# Patient Record
Sex: FEMALE | Race: WHITE | NOT HISPANIC OR LATINO | Employment: UNEMPLOYED | URBAN - METROPOLITAN AREA
[De-identification: names, ages, dates, MRNs, and addresses within clinical notes are randomized per-mention and may not be internally consistent; named-entity substitution may affect disease eponyms.]

---

## 2023-10-09 ENCOUNTER — OFFICE VISIT (OUTPATIENT)
Dept: FAMILY MEDICINE | Facility: CLINIC | Age: 58
End: 2023-10-09
Payer: COMMERCIAL

## 2023-10-09 ENCOUNTER — HOSPITAL ENCOUNTER (OUTPATIENT)
Dept: RADIOLOGY | Facility: HOSPITAL | Age: 58
Discharge: HOME | End: 2023-10-09
Attending: NURSE PRACTITIONER
Payer: COMMERCIAL

## 2023-10-09 ENCOUNTER — TELEPHONE (OUTPATIENT)
Dept: FAMILY MEDICINE | Facility: CLINIC | Age: 58
End: 2023-10-09

## 2023-10-09 VITALS
BODY MASS INDEX: 28.49 KG/M2 | HEIGHT: 65 IN | WEIGHT: 171 LBS | OXYGEN SATURATION: 99 % | HEART RATE: 72 BPM | SYSTOLIC BLOOD PRESSURE: 134 MMHG | DIASTOLIC BLOOD PRESSURE: 74 MMHG

## 2023-10-09 DIAGNOSIS — R06.02 SHORTNESS OF BREATH: ICD-10-CM

## 2023-10-09 DIAGNOSIS — R07.81 RIB PAIN: ICD-10-CM

## 2023-10-09 DIAGNOSIS — Z12.11 SCREENING FOR COLON CANCER: ICD-10-CM

## 2023-10-09 DIAGNOSIS — R92.8 ABNORMAL MAMMOGRAM: Primary | ICD-10-CM

## 2023-10-09 DIAGNOSIS — Z12.39 ENCOUNTER FOR SCREENING FOR MALIGNANT NEOPLASM OF BREAST, UNSPECIFIED SCREENING MODALITY: ICD-10-CM

## 2023-10-09 DIAGNOSIS — Z79.899 HIGH RISK MEDICATION USE: Primary | ICD-10-CM

## 2023-10-09 DIAGNOSIS — J34.89 SORE IN NOSE: ICD-10-CM

## 2023-10-09 DIAGNOSIS — G43.909 MIGRAINE WITHOUT STATUS MIGRAINOSUS, NOT INTRACTABLE, UNSPECIFIED MIGRAINE TYPE: ICD-10-CM

## 2023-10-09 DIAGNOSIS — Z00.00 PHYSICAL EXAM: ICD-10-CM

## 2023-10-09 DIAGNOSIS — R92.8 ABNORMAL MAMMOGRAM: ICD-10-CM

## 2023-10-09 PROCEDURE — 3078F DIAST BP <80 MM HG: CPT | Mod: CPTII,S$GLB,, | Performed by: NURSE PRACTITIONER

## 2023-10-09 PROCEDURE — 1159F MED LIST DOCD IN RCRD: CPT | Mod: CPTII,S$GLB,, | Performed by: NURSE PRACTITIONER

## 2023-10-09 PROCEDURE — 1160F PR REVIEW ALL MEDS BY PRESCRIBER/CLIN PHARMACIST DOCUMENTED: ICD-10-PCS | Mod: CPTII,S$GLB,, | Performed by: NURSE PRACTITIONER

## 2023-10-09 PROCEDURE — 3075F SYST BP GE 130 - 139MM HG: CPT | Mod: CPTII,S$GLB,, | Performed by: NURSE PRACTITIONER

## 2023-10-09 PROCEDURE — 3008F PR BODY MASS INDEX (BMI) DOCUMENTED: ICD-10-PCS | Mod: CPTII,S$GLB,, | Performed by: NURSE PRACTITIONER

## 2023-10-09 PROCEDURE — 99204 PR OFFICE/OUTPT VISIT, NEW, LEVL IV, 45-59 MIN: ICD-10-PCS | Mod: S$GLB,,, | Performed by: NURSE PRACTITIONER

## 2023-10-09 PROCEDURE — 3008F BODY MASS INDEX DOCD: CPT | Mod: CPTII,S$GLB,, | Performed by: NURSE PRACTITIONER

## 2023-10-09 PROCEDURE — 1160F RVW MEDS BY RX/DR IN RCRD: CPT | Mod: CPTII,S$GLB,, | Performed by: NURSE PRACTITIONER

## 2023-10-09 PROCEDURE — 99204 OFFICE O/P NEW MOD 45 MIN: CPT | Mod: S$GLB,,, | Performed by: NURSE PRACTITIONER

## 2023-10-09 PROCEDURE — 3075F PR MOST RECENT SYSTOLIC BLOOD PRESS GE 130-139MM HG: ICD-10-PCS | Mod: CPTII,S$GLB,, | Performed by: NURSE PRACTITIONER

## 2023-10-09 PROCEDURE — 1159F PR MEDICATION LIST DOCUMENTED IN MEDICAL RECORD: ICD-10-PCS | Mod: CPTII,S$GLB,, | Performed by: NURSE PRACTITIONER

## 2023-10-09 PROCEDURE — 71100 X-RAY EXAM RIBS UNI 2 VIEWS: CPT | Mod: TC,PO,RT

## 2023-10-09 PROCEDURE — 3078F PR MOST RECENT DIASTOLIC BLOOD PRESSURE < 80 MM HG: ICD-10-PCS | Mod: CPTII,S$GLB,, | Performed by: NURSE PRACTITIONER

## 2023-10-09 RX ORDER — METHYLPREDNISOLONE 4 MG/1
TABLET ORAL
Qty: 21 EACH | Refills: 0 | Status: SHIPPED | OUTPATIENT
Start: 2023-10-09 | End: 2023-10-30

## 2023-10-09 RX ORDER — MUPIROCIN 20 MG/G
OINTMENT TOPICAL 3 TIMES DAILY
Qty: 22 G | Refills: 0 | Status: SHIPPED | OUTPATIENT
Start: 2023-10-09

## 2023-10-09 NOTE — PROGRESS NOTES
SUBJECTIVE:    Patient ID: Coral Gerard is a 58 y.o. female.    Chief Complaint: Establish Care (No bottles, Establish care, Mammo, pain in left breast from fluid being aspirated form breast, Right rib pain, possible pulled muscle in left arm and elbow, Sore inside nose only happens when she goes to her house, forgot medication and does no know name and strengths of medication//BA )    58 year old female presents as new patient to establish care. Recently moved to area from [place]. Doing some renovations on home. Takes medications for migraine but not sure of name. Previously had biopsy of breast and reports fluid since. No recent mammogram. Does have some discomfort at times. No recent labs.    She has some pain in the arms and also the neck pain in the right shoulder and right side chest wall pain. Has not taken anything. Feels sob at times. This has occurred for several years. Worse with exertion        Office Visit on 10/09/2023   Component Date Value Ref Range Status    WBC 10/09/2023 7.4  3.8 - 10.8 Thousand/uL Final    RBC 10/09/2023 4.62  3.80 - 5.10 Million/uL Final    Hemoglobin 10/09/2023 13.9  11.7 - 15.5 g/dL Final    Hematocrit 10/09/2023 42.6  35.0 - 45.0 % Final    MCV 10/09/2023 92.2  80.0 - 100.0 fL Final    MCH 10/09/2023 30.1  27.0 - 33.0 pg Final    MCHC 10/09/2023 32.6  32.0 - 36.0 g/dL Final    RDW 10/09/2023 13.7  11.0 - 15.0 % Final    Platelets 10/09/2023 236  140 - 400 Thousand/uL Final    MPV 10/09/2023 11.4  7.5 - 12.5 fL Final    Neutrophils, Abs 10/09/2023 4,965  1,500 - 7,800 cells/uL Final    Lymph # 10/09/2023 1,695  850 - 3,900 cells/uL Final    Mono # 10/09/2023 636  200 - 950 cells/uL Final    Eos # 10/09/2023 67  15 - 500 cells/uL Final    Baso # 10/09/2023 37  0 - 200 cells/uL Final    Neutrophils Relative 10/09/2023 67.1  % Final    Lymph % 10/09/2023 22.9  % Final    Mono % 10/09/2023 8.6  % Final    Eosinophil % 10/09/2023 0.9  % Final    Basophil % 10/09/2023 0.5  %  Final    Glucose 10/09/2023 90  65 - 99 mg/dL Final    BUN 10/09/2023 20  7 - 25 mg/dL Final    Creatinine 10/09/2023 0.66  0.50 - 1.05 mg/dL Final    eGFR 10/09/2023 100  > OR = 60 mL/min/1.73m2 Final    BUN/Creatinine Ratio 10/09/2023 SEE NOTE:  6 - 22 (calc) Final    Sodium 10/09/2023 139  135 - 146 mmol/L Final    Potassium 10/09/2023 4.1  3.5 - 5.3 mmol/L Final    Chloride 10/09/2023 100  98 - 110 mmol/L Final    CO2 10/09/2023 26  20 - 32 mmol/L Final    Calcium 10/09/2023 10.0  8.6 - 10.4 mg/dL Final    Total Protein 10/09/2023 6.8  6.1 - 8.1 g/dL Final    Albumin 10/09/2023 4.4  3.6 - 5.1 g/dL Final    Globulin, Total 10/09/2023 2.4  1.9 - 3.7 g/dL (calc) Final    Albumin/Globulin Ratio 10/09/2023 1.8  1.0 - 2.5 (calc) Final    Total Bilirubin 10/09/2023 0.7  0.2 - 1.2 mg/dL Final    Alkaline Phosphatase 10/09/2023 74  37 - 153 U/L Final    AST 10/09/2023 11  10 - 35 U/L Final    ALT 10/09/2023 18  6 - 29 U/L Final    Cholesterol 10/09/2023 287 (H)  <200 mg/dL Final    HDL 10/09/2023 40 (L)  > OR = 50 mg/dL Final    Triglycerides 10/09/2023 732 (H)  <150 mg/dL Final    LDL Cholesterol 10/09/2023   mg/dL (calc) Final    HDL/Cholesterol Ratio 10/09/2023 7.2 (H)  <5.0 (calc) Final    Non HDL Chol. (LDL+VLDL) 10/09/2023 247 (H)  <130 mg/dL (calc) Final    TSH w/reflex to FT4 10/09/2023 1.52  0.40 - 4.50 mIU/L Final    Creatinine, Urine 10/09/2023 56  20 - 275 mg/dL Final    Microalb, Ur 10/09/2023 0.3  See Note: mg/dL Final    Microalb/Creat Ratio 10/09/2023 5  <30 mcg/mg creat Final    Color, UA 10/09/2023 YELLOW  YELLOW Final    Appearance, UA 10/09/2023 CLEAR  CLEAR Final    Specific Gravity, UA 10/09/2023 1.013  1.001 - 1.035 Final    pH, UA 10/09/2023 6.0  5.0 - 8.0 Final    Glucose, UA 10/09/2023 NEGATIVE  NEGATIVE Final    Bilirubin, UA 10/09/2023 NEGATIVE  NEGATIVE Final    Ketones, UA 10/09/2023 NEGATIVE  NEGATIVE Final    Occult Blood UA 10/09/2023 NEGATIVE  NEGATIVE Final    Protein, UA 10/09/2023  NEGATIVE  NEGATIVE Final    Nitrite, UA 10/09/2023 NEGATIVE  NEGATIVE Final    Leukocytes, UA 10/09/2023 NEGATIVE  NEGATIVE Final    WBC Casts, UA 10/09/2023 NONE SEEN  < OR = 5 /HPF Final    RBC Casts, UA 10/09/2023 NONE SEEN  < OR = 2 /HPF Final    Squam Epithel, UA 10/09/2023 NONE SEEN  < OR = 5 /HPF Final    Bacteria, UA 10/09/2023 NONE SEEN  NONE SEEN /HPF Final    Hyaline Casts, UA 10/09/2023 NONE SEEN  NONE SEEN /LPF Final    Service Cmt: 10/09/2023    Final    Reflexive Urine Culture 10/09/2023    Final       Past Medical History:   Diagnosis Date    Anxiety     Palpitations      Social History     Socioeconomic History    Marital status:    Tobacco Use    Smoking status: Former     Types: Cigarettes    Smokeless tobacco: Never   Substance and Sexual Activity    Alcohol use: Yes     Alcohol/week: 14.0 standard drinks of alcohol     Types: 14 Glasses of wine per week    Drug use: Never   Social History Narrative    Worked as . Not currently working    Gets about 7 hours of sleep per night    No formal exercise.     Works in yard, and pool.      Past Surgical History:   Procedure Laterality Date     SECTION       SECTION N/A     CHOLECYSTECTOMY N/A     HYSTERECTOMY       Family History   Adopted: Yes   Problem Relation Age of Onset    Diabetes Mother     Diabetes Father        Tests to Keep You Healthy    Mammogram: SCHEDULED  Colon Cancer Screening: ORDERED      Review of patient's allergies indicates:   Allergen Reactions    Demerol (pf) [meperidine (pf)] Itching       Current Outpatient Medications:     methylPREDNISolone (MEDROL DOSEPACK) 4 mg tablet, use as directed, Disp: 21 each, Rfl: 0    mupirocin (BACTROBAN) 2 % ointment, Apply topically 3 (three) times daily., Disp: 22 g, Rfl: 0    omega-3 acid ethyl esters (LOVAZA) 1 gram capsule, Take 2 capsules (2 g total) by mouth 2 (two) times daily., Disp: 360 capsule, Rfl: 3    rosuvastatin (CRESTOR) 10 MG tablet, Take 1 tablet  "(10 mg total) by mouth once daily., Disp: 90 tablet, Rfl: 3    Review of Systems   Constitutional:  Negative for chills, fever and unexpected weight change.   HENT:  Negative for ear pain, rhinorrhea and sore throat.    Eyes:  Negative for pain and visual disturbance.   Respiratory:  Negative for cough and shortness of breath.    Cardiovascular:  Negative for chest pain, palpitations and leg swelling.   Gastrointestinal:  Negative for abdominal pain, diarrhea, nausea and vomiting.   Genitourinary:  Negative for difficulty urinating, hematuria and vaginal bleeding.   Musculoskeletal:  Positive for arthralgias.   Skin:  Negative for rash.   Neurological:  Negative for dizziness, weakness and headaches.   Psychiatric/Behavioral:  Negative for agitation and sleep disturbance. The patient is not nervous/anxious.           Objective:      Vitals:    10/09/23 1156 10/09/23 1224   BP: (!) 140/88 134/74   Pulse: 72    SpO2: 99%    Weight: 77.6 kg (171 lb)    Height: 5' 5" (1.651 m)      Physical Exam  Vitals and nursing note reviewed.   Constitutional:       General: She is not in acute distress.     Appearance: Normal appearance. She is well-developed. She is not ill-appearing.   HENT:      Head: Normocephalic.      Right Ear: External ear normal.      Left Ear: External ear normal.   Eyes:      Conjunctiva/sclera: Conjunctivae normal.      Pupils: Pupils are equal, round, and reactive to light.   Neck:      Vascular: No JVD.   Cardiovascular:      Rate and Rhythm: Normal rate and regular rhythm.      Heart sounds: No murmur heard.  Pulmonary:      Effort: Pulmonary effort is normal.      Breath sounds: Normal breath sounds.   Chest:          Comments: Palpation tenderness. No deformity  Abdominal:      General: Bowel sounds are normal.      Palpations: Abdomen is soft.   Musculoskeletal:         General: No deformity. Normal range of motion.      Cervical back: Normal range of motion and neck supple.   Lymphadenopathy:     "  Cervical: No cervical adenopathy.   Skin:     General: Skin is warm and dry.      Findings: No rash.   Neurological:      Mental Status: She is alert and oriented to person, place, and time.      Gait: Gait normal.   Psychiatric:         Speech: Speech normal.         Behavior: Behavior normal.           Assessment:       1. High risk medication use    2. Physical exam    3. Migraine without status migrainosus, not intractable, unspecified migraine type    4. Encounter for screening for malignant neoplasm of breast, unspecified screening modality    5. Abnormal mammogram    6. Screening for colon cancer    7. Rib pain    8. Shortness of breath    9. Sore in nose         Plan:       High risk medication use  -     CBC Auto Differential; Future; Expected date: 10/09/2023  -     Comprehensive Metabolic Panel; Future; Expected date: 10/09/2023  -     Lipid Panel; Future; Expected date: 10/09/2023  -     TSH w/reflex to FT4; Future; Expected date: 10/09/2023  -     Microalbumin/Creatinine Ratio, Urine; Future; Expected date: 10/09/2023  -     Urinalysis, Reflex to Urine Culture Urine, Clean Catch; Future; Expected date: 10/09/2023    Physical exam  -     CBC Auto Differential; Future; Expected date: 10/09/2023  -     Comprehensive Metabolic Panel; Future; Expected date: 10/09/2023  -     Lipid Panel; Future; Expected date: 10/09/2023  -     TSH w/reflex to FT4; Future; Expected date: 10/09/2023  -     Microalbumin/Creatinine Ratio, Urine; Future; Expected date: 10/09/2023  -     Urinalysis, Reflex to Urine Culture Urine, Clean Catch; Future; Expected date: 10/09/2023    Migraine without status migrainosus, not intractable, unspecified migraine type  Comments:  not sure of meds  Orders:  -     CBC Auto Differential; Future; Expected date: 10/09/2023  -     Comprehensive Metabolic Panel; Future; Expected date: 10/09/2023  -     Lipid Panel; Future; Expected date: 10/09/2023  -     TSH w/reflex to FT4; Future; Expected  date: 10/09/2023  -     Microalbumin/Creatinine Ratio, Urine; Future; Expected date: 10/09/2023  -     Urinalysis, Reflex to Urine Culture Urine, Clean Catch; Future; Expected date: 10/09/2023    Encounter for screening for malignant neoplasm of breast, unspecified screening modality  -     Mammo Digital Screening Bilat; Future; Expected date: 10/09/2023    Abnormal mammogram  Comments:  migraine  Orders:  -     Mammo Digital Screening Bilat; Future; Expected date: 10/09/2023    Screening for colon cancer  -     Cologuard Screening (Multitarget Stool DNA); Future; Expected date: 10/09/2023    Rib pain  Comments:  xray  Orders:  -     X-Ray Ribs 2 View Right; Future; Expected date: 10/09/2023    Shortness of breath  Comments:  pft  Orders:  -     Complete PFT w/ bronchodilator; Future    Sore in nose  Comments:  bactroban    Other orders  -     methylPREDNISolone (MEDROL DOSEPACK) 4 mg tablet; use as directed  Dispense: 21 each; Refill: 0  -     mupirocin (BACTROBAN) 2 % ointment; Apply topically 3 (three) times daily.  Dispense: 22 g; Refill: 0      Follow up in about 6 months (around 4/9/2024), or if symptoms worsen or fail to improve, for medication management.        10/18/2023 Milla Floyd

## 2023-10-09 NOTE — TELEPHONE ENCOUNTER
Received a call from [facility] that states the patient told them her last mammogram resulted in a biopsy and she has not had a follow up since then. Also states she has been having breast pain so they need an order for diagnostic.

## 2023-10-10 ENCOUNTER — TELEPHONE (OUTPATIENT)
Dept: FAMILY MEDICINE | Facility: CLINIC | Age: 58
End: 2023-10-10

## 2023-10-10 LAB
ALBUMIN SERPL-MCNC: 4.4 G/DL (ref 3.6–5.1)
ALBUMIN/CREAT UR: 5 MCG/MG CREAT
ALBUMIN/GLOB SERPL: 1.8 (CALC) (ref 1–2.5)
ALP SERPL-CCNC: 74 U/L (ref 37–153)
ALT SERPL-CCNC: 18 U/L (ref 6–29)
APPEARANCE UR: CLEAR
AST SERPL-CCNC: 11 U/L (ref 10–35)
BACTERIA #/AREA URNS HPF: NORMAL /HPF
BACTERIA UR CULT: NORMAL
BASOPHILS # BLD AUTO: 37 CELLS/UL (ref 0–200)
BASOPHILS NFR BLD AUTO: 0.5 %
BILIRUB SERPL-MCNC: 0.7 MG/DL (ref 0.2–1.2)
BILIRUB UR QL STRIP: NEGATIVE
BUN SERPL-MCNC: 20 MG/DL (ref 7–25)
BUN/CREAT SERPL: NORMAL (CALC) (ref 6–22)
CALCIUM SERPL-MCNC: 10 MG/DL (ref 8.6–10.4)
CHLORIDE SERPL-SCNC: 100 MMOL/L (ref 98–110)
CHOLEST SERPL-MCNC: 287 MG/DL
CHOLEST/HDLC SERPL: 7.2 (CALC)
CO2 SERPL-SCNC: 26 MMOL/L (ref 20–32)
COLOR UR: YELLOW
CREAT SERPL-MCNC: 0.66 MG/DL (ref 0.5–1.05)
CREAT UR-MCNC: 56 MG/DL (ref 20–275)
EGFR: 100 ML/MIN/1.73M2
EOSINOPHIL # BLD AUTO: 67 CELLS/UL (ref 15–500)
EOSINOPHIL NFR BLD AUTO: 0.9 %
ERYTHROCYTE [DISTWIDTH] IN BLOOD BY AUTOMATED COUNT: 13.7 % (ref 11–15)
GLOBULIN SER CALC-MCNC: 2.4 G/DL (CALC) (ref 1.9–3.7)
GLUCOSE SERPL-MCNC: 90 MG/DL (ref 65–99)
GLUCOSE UR QL STRIP: NEGATIVE
HCT VFR BLD AUTO: 42.6 % (ref 35–45)
HDLC SERPL-MCNC: 40 MG/DL
HGB BLD-MCNC: 13.9 G/DL (ref 11.7–15.5)
HGB UR QL STRIP: NEGATIVE
HYALINE CASTS #/AREA URNS LPF: NORMAL /LPF
KETONES UR QL STRIP: NEGATIVE
LDLC SERPL CALC-MCNC: ABNORMAL MG/DL (CALC)
LEUKOCYTE ESTERASE UR QL STRIP: NEGATIVE
LYMPHOCYTES # BLD AUTO: 1695 CELLS/UL (ref 850–3900)
LYMPHOCYTES NFR BLD AUTO: 22.9 %
MCH RBC QN AUTO: 30.1 PG (ref 27–33)
MCHC RBC AUTO-ENTMCNC: 32.6 G/DL (ref 32–36)
MCV RBC AUTO: 92.2 FL (ref 80–100)
MICROALBUMIN UR-MCNC: 0.3 MG/DL
MONOCYTES # BLD AUTO: 636 CELLS/UL (ref 200–950)
MONOCYTES NFR BLD AUTO: 8.6 %
NEUTROPHILS # BLD AUTO: 4965 CELLS/UL (ref 1500–7800)
NEUTROPHILS NFR BLD AUTO: 67.1 %
NITRITE UR QL STRIP: NEGATIVE
NONHDLC SERPL-MCNC: 247 MG/DL (CALC)
PH UR STRIP: 6 [PH] (ref 5–8)
PLATELET # BLD AUTO: 236 THOUSAND/UL (ref 140–400)
PMV BLD REES-ECKER: 11.4 FL (ref 7.5–12.5)
POTASSIUM SERPL-SCNC: 4.1 MMOL/L (ref 3.5–5.3)
PROT SERPL-MCNC: 6.8 G/DL (ref 6.1–8.1)
PROT UR QL STRIP: NEGATIVE
RBC # BLD AUTO: 4.62 MILLION/UL (ref 3.8–5.1)
RBC #/AREA URNS HPF: NORMAL /HPF
SERVICE CMNT-IMP: NORMAL
SODIUM SERPL-SCNC: 139 MMOL/L (ref 135–146)
SP GR UR STRIP: 1.01 (ref 1–1.03)
SQUAMOUS #/AREA URNS HPF: NORMAL /HPF
TRIGL SERPL-MCNC: 732 MG/DL
TSH SERPL-ACNC: 1.52 MIU/L (ref 0.4–4.5)
WBC # BLD AUTO: 7.4 THOUSAND/UL (ref 3.8–10.8)
WBC #/AREA URNS HPF: NORMAL /HPF

## 2023-10-10 NOTE — TELEPHONE ENCOUNTER
Spoke to patient with result verbatim per Milla. Verbalized understanding. Said Milla was supposed to call in an ointment for a sore in her nose. Said she got the Bactroban but she thinks this was for her elbow.  Would like clarification on what the Bactroban is for.

## 2023-10-11 RX ORDER — OMEGA-3-ACID ETHYL ESTERS 1 G/1
2 CAPSULE, LIQUID FILLED ORAL 2 TIMES DAILY
Qty: 360 CAPSULE | Refills: 3 | Status: SHIPPED | OUTPATIENT
Start: 2023-10-11 | End: 2024-10-10

## 2023-10-11 RX ORDER — ROSUVASTATIN CALCIUM 10 MG/1
10 TABLET, COATED ORAL DAILY
Qty: 90 TABLET | Refills: 3 | Status: SHIPPED | OUTPATIENT
Start: 2023-10-11 | End: 2024-10-10

## 2023-10-12 ENCOUNTER — TELEPHONE (OUTPATIENT)
Dept: FAMILY MEDICINE | Facility: CLINIC | Age: 58
End: 2023-10-12

## 2023-10-12 NOTE — TELEPHONE ENCOUNTER
----- Message from Milla Floyd NP sent at 10/11/2023  4:29 PM CDT -----  Cholesterol and triglycerides are Extremely high. There is increased risk of pancreatitis when the   triglyceride concentration is this high. Start low fat diet. Avoid alcohol if any. Start lovaza and crestor 10mg daily. Rest of labs are within normal limits.

## 2023-10-12 NOTE — TELEPHONE ENCOUNTER
Spoke with patient and let her know the below per Milla verbatim. Wants to know when to repeat labs.

## 2023-10-16 ENCOUNTER — TELEPHONE (OUTPATIENT)
Dept: FAMILY MEDICINE | Facility: CLINIC | Age: 58
End: 2023-10-16

## 2023-10-16 NOTE — TELEPHONE ENCOUNTER
----- Message from Filomena Vail sent at 10/16/2023  9:57 AM CDT -----  What is the diagnosis for PA for Oliver? Thank you

## 2023-10-19 ENCOUNTER — HOSPITAL ENCOUNTER (OUTPATIENT)
Dept: PULMONOLOGY | Facility: HOSPITAL | Age: 58
Discharge: HOME | End: 2023-10-19
Attending: NURSE PRACTITIONER
Payer: COMMERCIAL

## 2023-10-19 DIAGNOSIS — R06.02 SHORTNESS OF BREATH: ICD-10-CM

## 2023-10-19 PROCEDURE — 94010 BREATHING CAPACITY TEST: CPT

## 2023-10-19 PROCEDURE — 94729 DIFFUSING CAPACITY: CPT

## 2023-10-19 PROCEDURE — 94727 GAS DIL/WSHOT DETER LNG VOL: CPT

## 2023-10-25 ENCOUNTER — PATIENT MESSAGE (OUTPATIENT)
Dept: FAMILY MEDICINE | Facility: CLINIC | Age: 58
End: 2023-10-25

## 2023-10-26 ENCOUNTER — PATIENT MESSAGE (OUTPATIENT)
Dept: FAMILY MEDICINE | Facility: CLINIC | Age: 58
End: 2023-10-26

## 2023-10-26 NOTE — TELEPHONE ENCOUNTER
No appointment necessary. The pft is normal. Maybe some degenerative changes going on in elbow and shoulder. Would she like to see ortho?

## 2023-10-28 LAB — NONINV COLON CA DNA+OCC BLD SCRN STL QL: NEGATIVE

## 2023-10-29 ENCOUNTER — PATIENT MESSAGE (OUTPATIENT)
Dept: FAMILY MEDICINE | Facility: CLINIC | Age: 58
End: 2023-10-29

## 2023-10-30 ENCOUNTER — PATIENT MESSAGE (OUTPATIENT)
Dept: FAMILY MEDICINE | Facility: CLINIC | Age: 58
End: 2023-10-30

## 2023-10-30 ENCOUNTER — TELEPHONE (OUTPATIENT)
Dept: FAMILY MEDICINE | Facility: CLINIC | Age: 58
End: 2023-10-30

## 2023-10-30 DIAGNOSIS — G89.29 CHRONIC LEFT SHOULDER PAIN: ICD-10-CM

## 2023-10-30 DIAGNOSIS — R07.81 RIB PAIN: Primary | ICD-10-CM

## 2023-10-30 DIAGNOSIS — M25.512 CHRONIC LEFT SHOULDER PAIN: ICD-10-CM

## 2023-10-30 NOTE — TELEPHONE ENCOUNTER
Spoke with patient gave results verbatim per Milla, patient verbalized understanding. Remind me created 1 year.

## 2023-11-13 ENCOUNTER — HOSPITAL ENCOUNTER (OUTPATIENT)
Dept: RADIOLOGY | Facility: HOSPITAL | Age: 58
Discharge: HOME | End: 2023-11-13
Attending: NURSE PRACTITIONER
Payer: COMMERCIAL

## 2023-11-13 VITALS — BODY MASS INDEX: 28.5 KG/M2 | WEIGHT: 171.06 LBS | HEIGHT: 65 IN

## 2023-11-13 DIAGNOSIS — R92.8 ABNORMAL MAMMOGRAM: ICD-10-CM

## 2023-11-13 PROCEDURE — 76642 ULTRASOUND BREAST LIMITED: CPT | Mod: TC,PO,LT

## 2023-11-13 PROCEDURE — 77062 BREAST TOMOSYNTHESIS BI: CPT | Mod: TC,PO

## 2023-11-14 ENCOUNTER — TELEPHONE (OUTPATIENT)
Dept: FAMILY MEDICINE | Facility: CLINIC | Age: 58
End: 2023-11-14

## 2023-11-14 ENCOUNTER — OFFICE VISIT (OUTPATIENT)
Dept: ORTHOPEDICS | Facility: CLINIC | Age: 58
End: 2023-11-14
Payer: COMMERCIAL

## 2023-11-14 VITALS — WEIGHT: 171 LBS | HEIGHT: 65 IN | BODY MASS INDEX: 28.49 KG/M2

## 2023-11-14 DIAGNOSIS — M77.12 LATERAL EPICONDYLITIS OF LEFT ELBOW: ICD-10-CM

## 2023-11-14 DIAGNOSIS — M75.42 IMPINGEMENT SYNDROME OF LEFT SHOULDER: Primary | ICD-10-CM

## 2023-11-14 PROCEDURE — 1159F MED LIST DOCD IN RCRD: CPT | Mod: CPTII,S$GLB,, | Performed by: ORTHOPAEDIC SURGERY

## 2023-11-14 PROCEDURE — 99203 PR OFFICE/OUTPT VISIT, NEW, LEVL III, 30-44 MIN: ICD-10-PCS | Mod: 25,S$GLB,, | Performed by: ORTHOPAEDIC SURGERY

## 2023-11-14 PROCEDURE — 1160F RVW MEDS BY RX/DR IN RCRD: CPT | Mod: CPTII,S$GLB,, | Performed by: ORTHOPAEDIC SURGERY

## 2023-11-14 PROCEDURE — 20610 DRAIN/INJ JOINT/BURSA W/O US: CPT | Mod: LT,S$GLB,, | Performed by: ORTHOPAEDIC SURGERY

## 2023-11-14 PROCEDURE — 1159F PR MEDICATION LIST DOCUMENTED IN MEDICAL RECORD: ICD-10-PCS | Mod: CPTII,S$GLB,, | Performed by: ORTHOPAEDIC SURGERY

## 2023-11-14 PROCEDURE — 3008F PR BODY MASS INDEX (BMI) DOCUMENTED: ICD-10-PCS | Mod: CPTII,S$GLB,, | Performed by: ORTHOPAEDIC SURGERY

## 2023-11-14 PROCEDURE — 3008F BODY MASS INDEX DOCD: CPT | Mod: CPTII,S$GLB,, | Performed by: ORTHOPAEDIC SURGERY

## 2023-11-14 PROCEDURE — 99203 OFFICE O/P NEW LOW 30 MIN: CPT | Mod: 25,S$GLB,, | Performed by: ORTHOPAEDIC SURGERY

## 2023-11-14 PROCEDURE — 1160F PR REVIEW ALL MEDS BY PRESCRIBER/CLIN PHARMACIST DOCUMENTED: ICD-10-PCS | Mod: CPTII,S$GLB,, | Performed by: ORTHOPAEDIC SURGERY

## 2023-11-14 PROCEDURE — 20610 LARGE JOINT ASPIRATION/INJECTION: L SUBACROMIAL BURSA: ICD-10-PCS | Mod: LT,S$GLB,, | Performed by: ORTHOPAEDIC SURGERY

## 2023-11-14 RX ORDER — TRIAMCINOLONE ACETONIDE 40 MG/ML
40 INJECTION, SUSPENSION INTRA-ARTICULAR; INTRAMUSCULAR
Status: DISCONTINUED | OUTPATIENT
Start: 2023-11-14 | End: 2023-11-14 | Stop reason: HOSPADM

## 2023-11-14 RX ADMIN — TRIAMCINOLONE ACETONIDE 40 MG: 40 INJECTION, SUSPENSION INTRA-ARTICULAR; INTRAMUSCULAR at 10:11

## 2023-11-14 NOTE — PROGRESS NOTES
[facility]    Subjective:     Chief Complaint:   Chief Complaint   Patient presents with    Left Shoulder - Pain     Patient is here with complaints of Left Shoulder & Elbow pain x 3-4 months after moving some furniture.  ROM is painful & limited, Has numbness & tingling in her hand and some on forearm, Previous surgery on forearm.        Past Medical History:   Diagnosis Date    Anxiety     Palpitations        Past Surgical History:   Procedure Laterality Date    BREAST BIOPSY      BREAST CYST ASPIRATION       SECTION       SECTION N/A     CHOLECYSTECTOMY N/A     HYSTERECTOMY         Current Outpatient Medications   Medication Sig    mupirocin (BACTROBAN) 2 % ointment Apply topically 3 (three) times daily.    omega-3 acid ethyl esters (LOVAZA) 1 gram capsule Take 2 capsules (2 g total) by mouth 2 (two) times daily.    rosuvastatin (CRESTOR) 10 MG tablet Take 1 tablet (10 mg total) by mouth once daily.     No current facility-administered medications for this visit.       Review of patient's allergies indicates:   Allergen Reactions    Adhesive      Paper tape ok    Chlorpheniramine-pseudoephed      anphylaxis    Demerol (pf) [meperidine (pf)] Itching    Ecklonia kurome      Anaphylaxis from iodine dye       Family History   Adopted: Yes   Problem Relation Age of Onset    Diabetes Mother     Diabetes Father        Social History     Socioeconomic History    Marital status:    Tobacco Use    Smoking status: Former     Types: Cigarettes    Smokeless tobacco: Never   Substance and Sexual Activity    Alcohol use: Yes     Alcohol/week: 14.0 standard drinks of alcohol     Types: 14 Glasses of wine per week    Drug use: Never   Social History Narrative    Worked as . Not currently working    Gets about 7 hours of sleep per night    No formal exercise.     Works in yard, and pool.        History of present illness: Coral comes in today as a new patient chief complaint of left  shoulder and elbow pain that has been going on for several months.  She does not recollect any specific injury or trauma.  She attributes this to doing some house remodeling her new home as moving furniture.  Unfortunately, she is left-hand dominant.  Her primary care provider tried a Medrol Dosepak without much relief of her symptoms.    Review of Systems:    Constitution: Negative for chills, fever, and sweats.  Negative for unexplained weight loss.    HENT:  Negative for headaches and blurry vision.    Cardiovascular:Negative for chest pain or irregular heart beat. Negative for hypertension.    Respiratory:  Negative for cough and shortness of breath.    Gastrointestinal: Negative for abdominal pain, heartburn, melena, nausea, and vomitting.    Genitourinary:  Negative bladder incontinence and dysuria.    Musculoskeletal:  See HPI for details.     Neurological: Negative for numbness.    Psychiatric/Behavioral: Negative for depression.  The patient is not nervous/anxious.      Endocrine: Negative for polyuria    Hematologic/Lymphatic: Negative for bleeding problem.  Does not bruise/bleed easily.    Skin: Negative for poor would healing and rash    Objective:      Physical Examination:    Vital Signs:  There were no vitals filed for this visit.    Body mass index is 28.46 kg/m².    This a well-developed, well nourished patient in no acute distress.  They are alert and oriented and cooperative to examination.        Left upper extremity exam: Skin throughout the left upper extremity is clean dry and intact.  There is no erythema or ecchymosis.  There are no signs or symptoms of infection.  She is neurovascularly intact throughout the left upper extremity.  She has full active range of motion of the left shoulder forward flexion, external rotation, and internal rotation.  She can fully flex/extend the left elbow and pronate/supinate the left forearm.  She can open and close her left hand into a fist.  She can oppose  "left thumb to all digits in the left hand.  She does have a positive Neer impingement sign as well as a positive Roca test to the left shoulder.  Some mild tenderness to palpation over left AC joint with a positive crossover testing.  Her left rotator cuff tendon is intact to resisted muscle testing and strong.  She is mildly tender to palpation over the left medial and lateral epicondyles.  No real reproduction of symptoms with resisted left wrist flexion and extension.      Pertinent New Results:    XRAY Report / Interpretation:   Three views taken of the left elbow today: AP, lateral, oblique views.  They reveal no acute fractures or dislocations.  Visualized soft tissues appear unremarkable.      Two views taken of the left shoulder today:  AP and Y-views.  They reveal no acute fractures or dislocations.  Visualized soft tissues appear unremarkable.  She does have degenerative changes in the left acromioclavicular joint.    Assessment/Plan:      1. Left shoulder subacromial bursitis.    2. Left shoulder AC joint osteoarthritis.    3. Left elbow medial epicondylitis    4. Left elbow lateral epicondylitis.      I injected her left shoulder today in the subacromial space via a posterolateral approach with 40 mg of Kenalog and lidocaine.  She tolerated this well.  We will also get her started in formal physical therapy.  We will check her back in 6 weeks to see how she responds.  If she is not much improved, we will proceed with advanced imaging of an MRI to the left shoulder.  For her elbow symptoms, they are fairly mild.  We will just continue to monitor this.  We can inject them at any point in the future she does regress or worsens in any way.    Merrill Trujillo, Physician Assistant, served in the capacity as a "scribe" for this patient encounter.  A "face-to-face" encounter occurred with Dr. Torres Jimenez on this date.  The treatment plan and medical decision-making is outlined above. Patient was seen " and examined with a chaperone.       This note was created using Dragon voice recognition software that occasionally misinterpreted phrases or words.

## 2023-11-14 NOTE — PROCEDURES
Large Joint Aspiration/Injection: L subacromial bursa    Date/Time: 11/14/2023 10:30 AM    Performed by: Torres Jimenez MD  Authorized by: Torres Jimenez MD    Consent Done?:  Yes (Verbal)  Indications:  Pain  Site marked: the procedure site was marked    Timeout: prior to procedure the correct patient, procedure, and site was verified    Prep: patient was prepped and draped in usual sterile fashion      Local anesthesia used?: Yes    Local anesthetic:  Lidocaine 1% without epinephrine    Details:  Needle Size:  25 G  Ultrasonic Guidance for needle placement?: No    Location:  Shoulder  Site:  L subacromial bursa  Medications:  40 mg triamcinolone acetonide 40 mg/mL  Patient tolerance:  Patient tolerated the procedure well with no immediate complications

## 2023-11-16 ENCOUNTER — CLINICAL SUPPORT (OUTPATIENT)
Dept: REHABILITATION | Facility: HOSPITAL | Age: 58
End: 2023-11-16
Payer: COMMERCIAL

## 2023-11-16 DIAGNOSIS — M75.42 IMPINGEMENT SYNDROME OF LEFT SHOULDER: ICD-10-CM

## 2023-11-16 DIAGNOSIS — R29.3 POSTURE ABNORMALITY: ICD-10-CM

## 2023-11-16 DIAGNOSIS — M25.60 RANGE OF MOTION DEFICIT: Primary | ICD-10-CM

## 2023-11-16 PROCEDURE — 97110 THERAPEUTIC EXERCISES: CPT | Mod: PN

## 2023-11-16 PROCEDURE — 97161 PT EVAL LOW COMPLEX 20 MIN: CPT | Mod: PN

## 2023-11-17 PROBLEM — R29.3 POSTURE ABNORMALITY: Status: ACTIVE | Noted: 2023-11-17

## 2023-11-17 PROBLEM — M25.60 RANGE OF MOTION DEFICIT: Status: ACTIVE | Noted: 2023-11-17

## 2023-11-17 NOTE — PLAN OF CARE
[facility]   Physical Therapy Initial Evaluation      Name: Coral Gerard  [facility] Number: [id number]    Therapy Diagnosis:   Encounter Diagnoses   Name Primary?    Impingement syndrome of left shoulder     Range of motion deficit Yes    Posture abnormality         Physician: Torres Jimenez MD    Physician Orders: PT Eval and treat   Medical Diagnosis from Referral:   Diagnosis   M75.42 (ICD-10-CM) - Impingement syndrome of left shoulder     Evaluation Date: 11/16/2023  Authorization Period Expiration: 12/31/2023   Plan of Care Expiration: 2/8/2024  Progress Note Due: 12/16/2023  Visit # / Visits authorized: 1/ 1   FOTO: 1/ 3    Precautions: Standard     Time In: 300pm  Time Out: 355pm  Total Billable Time: 55 minutes    Subjective     Date of onset: 6+ months    History of current condition - Hernan reports: left shoulder and left elbow pain. She also has mild chronic neck pain. Left shoulder pain is constant and worsens when she tries to use it. It travels to her elbow and forearm. Sometimes her hand if it is really bad. Numbness and tingling as well.  She had an injection in her shoulder recently. That may have helped some but is wearing off now.   Previous forearm surgery, removal of something growing on her muscle.  She has chronic migraines. Sometimes they start in her superior neck and travel up the back of her head. Sometimes they start behind her eyes and forehead.  She fell when she lived in [place]. She hurt her right shoulder and was having therapy to stretch that shoulder. At one point during this therapy, her left arm went completely numb. They told her because of that, she couldn't come back to therapy. She had imaging on her neck that showed bulging discs. These symptoms did stop since then. This is different now that it is mainly shoulder and elbow.    Falls: one on her right in [place]    Imaging: see imaging section    Prior Therapy: stretching for right shoulder issues after the  fall  Social History: Lives with family  Occupation: retired, arts and crafts (sew, paint, embroidery)  Prior Level of Function: independent in Activities of daily living and hobbies  Current Level of Function: pain affecting quality of life    Pain:  Current 3/10, worst 7/10, best 0/10   Location: left shoulder and elbow  Description: achy, numb, tingling  Aggravating Factors: reaching up and back  Easing Factors: icy hot gel, ibuprofen    Patients goals: be pain free as much as possible     Medical History:   Past Medical History:   Diagnosis Date    Anxiety     Palpitations        Surgical History:   Coral Gerard  has a past surgical history that includes Hysterectomy; Cholecystectomy (N/A);  section;  section (N/A); Breast biopsy; and Breast cyst aspiration.    Medications:   Coral has a current medication list which includes the following prescription(s): mupirocin, omega-3 acid ethyl esters, and rosuvastatin.    Allergies:   Review of patient's allergies indicates:   Allergen Reactions    Adhesive      Paper tape ok    Chlorpheniramine-pseudoephed      anphylaxis    Demerol (pf) [meperidine (pf)] Itching    Ecklonia kurome      Anaphylaxis from iodine dye        Objective      Observation:  Patient presents to clinic alert and oriented.    Posture:  Patient has protracted scapulae, worse on the left with Bilateral anteriorly placed humeral heads (rounded shoulders)    Range of motion:  Active Left  Right    Flexion 130    Abduction  130    Internal Rotation  full    External Rotation  Posterior belt line, painful      Passive Left  Right    Flexion 140    Abduction      Internal Rotation  80    External Rotation  80 painful at end range      Strength:    Special Tests:   Left  Right    Hawkin's Delfino Positive  Negative    Neer's Unable to assess fully due to pain with Internal Rotation  Negative    Painful Arc Negative (pain at end range) Negative    Drop Arm Negative  Negative        Upper  Extremity Neuro Screen:  Dermatomes:  Level Left RIght   C1 (Vertex of Head) Within Normal Limits Within Normal Limits   C2 (Posterior to ear) Within Normal Limits Within Normal Limits   C3 (Lateral neck) Within Normal Limits Within Normal Limits   C4 (Lateral shoulder area) Within Normal Limits Within Normal Limits   C5 (Lateral arm) Within Normal Limits Within Normal Limits   C6 (Posterior thumb) Within Normal Limits Within Normal Limits   C7 (Posterior middle finger) Within Normal Limits Within Normal Limits   C8 (Posterior little finger) Within Normal Limits Within Normal Limits   T1 (Medial Forearm) Within Normal Limits Within Normal Limits     Myotomes:  Level Left RIght   C5 (Shoulder abduction) 4-/5 4-/5   C6 (Elbow Flexion/ Wrist extension) 4-/5 4-/5   C7 (Elbow Extension/ Wrist Flexion) 4-/5 4-/5   C8 (Thumb Extension) 4-/5 4-/5   T1 (Finger abduction/ adduction) 4-/5 4-/5     Reflexes:  Level Left Right   C5 (Biceps) 2+ 2+   C6 (Brachioradialis) 0 0   C7 (Triceps) 0 2+      Cervical Screen:     Cervical Range of Motion:  Active Range of Motion  Motion Range  Pain and Quality of Motion Norms   Flexion 45  80-90 Degrees   Extension 30  70-80 Degrees   Right Side Bending 20  20-45 Degrees   Left Side Bending 20  20-45 Degrees   Right Rotation 50  70-90 Degrees   Left Rotation 50  70-90 Degrees     Passive Range of Motion:  Motion Range Pain and mobility Norms   C0/1 Flexion/Extension 0/5  5/10 Degrees   C0/1 Side bend right/left 0/0  5/5 Degrees   C1/2 Rotation right/left 20/20  45/45 Degrees   C0-3 Rotation right/left 50/50  60/60 Degrees     Joint Mobility:  C2-7 side glide assessment Hypomobile in lower cervical spine   First Rib assessment right/left Tender and hypomobile on the left      Cervical Special Tests:    Upper Cervical Stability and Red Flag Testing:   Left Right   Alar Ligament Negative  Negative    Transverse Ligament Negative  Negative      Cervical Cluster:   Left Right   Spurling's  Negative (no Upper extremity pain) Negative    Distraction Negative  Negative    Median Nerve Tension Test Positive  Negative    Cervical Rotation <60  Positive  Negative        Intake Outcome Measure for FOTO Shoulder Survey    Therapist reviewed FOTO scores for Coral Gerard on 11/16/2023.   FOTO report - see Media section or FOTO account episode details.    Intake Score: see media section         Treatment     Total Treatment time (time-based codes) separate from Evaluation: 10 minutes     Hernan received the treatments listed below:      therapeutic exercises to develop strength and ROM for 10 minutes including:    Side Lying open book x 10 Bilateral   Supine chin tucks x 10  Shoulder Internal Rotation x 10      Patient Education and Home Exercises     Education provided:   - Home Exercise Program, diagnosis, prognosis, Plan of care     Written Home Exercises Provided: yes. Exercises were reviewed and Hernan was able to demonstrate them prior to the end of the session.  Hernan demonstrated good  understanding of the education provided. See EMR under Patient Instructions for exercises provided during therapy sessions.    Assessment     Coral is a 58 y.o. female referred to outpatient Physical Therapy with a medical diagnosis of shoulder impingement. Patient presents with posture abnormalities, limited and painful range of motion, adverse neural tension. At the moment it is unclear whether neural symptoms are coming from her neck or her thoracic outlet. I will assess further at follow up.     Patient prognosis is Good.   Patient will benefit from skilled outpatient Physical Therapy to address the deficits stated above and in the chart below, provide patient /family education, and to maximize patientt's level of independence.     Plan of care discussed with patient: Yes  Patient's spiritual, cultural and educational needs considered and patient is agreeable to the plan of care and goals as stated below:     Anticipated  Barriers for therapy: age, BMI, co-morbidities    Medical Necessity is demonstrated by the following  History  Co-morbidities and personal factors that may impact the plan of care [] LOW: no personal factors / co-morbidities  [] MODERATE: 1-2 personal factors / co-morbidities  [x] HIGH: 3+ personal factors / co-morbidities    Moderate / High Support Documentation:   Co-morbidities affecting plan of care: BMI, age, anxiety    Personal Factors:   no deficits     Examination  Body Structures and Functions, activity limitations and participation restrictions that may impact the plan of care [] LOW: addressing 1-2 elements  [] MODERATE: 3+ elements  [x] HIGH: 4+ elements (please support below)    Moderate / High Support Documentation: posture, strength, range of motion, neural tension     Clinical Presentation [x] LOW: stable  [] MODERATE: Evolving  [] HIGH: Unstable     Decision Making/ Complexity Score: low       Goals:  Short Term Goals: 6 weeks   Patient will be independent in Home Exercise Program.  Patient will have improved cervical motion by 5 degrees in rotation.  Patient will have centralized symptoms proximal to elbow.    Long Term Goals: 12 weeks   Patient will be independent in progressed Home Exercise Program.  Patient will have decreased pain with Internal Rotation.  Patient goal:be pain free as much as possible  Patient will show improved FOTO limitation score to predicted level to show true functional improvement.     Plan     Plan of care Certification: 11/16/2023 to 2/8/2024.    Outpatient Physical Therapy 1-2 times weekly for 12 weeks to include the following interventions: Manual Therapy, Moist Heat/ Ice, Neuromuscular Re-ed, Patient Education, Therapeutic Activities, and Therapeutic Exercise.     Oralia Medina, PT, DPT        Physician's Signature: _________________________________________ Date: ________________

## 2023-11-21 ENCOUNTER — CLINICAL SUPPORT (OUTPATIENT)
Dept: REHABILITATION | Facility: HOSPITAL | Age: 58
End: 2023-11-21
Payer: COMMERCIAL

## 2023-11-21 DIAGNOSIS — R29.3 POSTURE ABNORMALITY: ICD-10-CM

## 2023-11-21 DIAGNOSIS — M25.60 RANGE OF MOTION DEFICIT: Primary | ICD-10-CM

## 2023-11-21 PROCEDURE — 97112 NEUROMUSCULAR REEDUCATION: CPT | Mod: PN

## 2023-11-21 PROCEDURE — 97140 MANUAL THERAPY 1/> REGIONS: CPT | Mod: PN

## 2023-11-21 PROCEDURE — 97110 THERAPEUTIC EXERCISES: CPT | Mod: PN

## 2023-11-21 NOTE — PROGRESS NOTES
"[facility]   Physical Therapy Treatment Note     Name: Coral Gerard  [facility] Number: [id number]    Therapy Diagnosis:   Encounter Diagnoses   Name Primary?    Range of motion deficit Yes    Posture abnormality      Physician: Torres Jimenez MD    Visit Date: 11/21/2023    Physician Orders: PT Eval and treat   Medical Diagnosis from Referral:   Diagnosis   M75.42 (ICD-10-CM) - Impingement syndrome of left shoulder      Evaluation Date: 11/16/2023  Authorization Period Expiration: 12/31/2023   Plan of Care Expiration: 2/8/2024  Progress Note Due: 12/16/2023  Visit # / Visits authorized: 1/ 20  FOTO: 1/ 3    FOTO 1st:   FOTO 3rd:  FOTO 10th:    Time in: 100pm  Time out: 145pm  Total Billable Time: 45 minutes    Precautions:  Standard      SUBJECTIVE     Pt reports: feeling better today. Feeling some discomfort in her elbow.    She was compliant with home exercise program.  Response to previous treatment: ongoing  Functional change: ongoing    Pain: 3/10  Location: left Upper extremity      OBJECTIVE     Objective Measures updated at progress report unless specified.     Treatment       Hernan received the treatments listed below:      Manual therapy techniques: Joint mobilizations and Soft tissue Mobilization were applied to the: neck and left Upper extremity for 9 minutes, including:    Left shoulder Inferior and posterior mobilization grade III  Suboccipital distraction and release grade III  Prone CTJ gapping grade III (patient not comfortable in prone)    Therapeutic exercises to develop strength, endurance, ROM, flexibility, posture, and core stabilization for 23 minutes including:    Side Lying open book x 15 Bilateral   Seated thoracic extension 5" x 15  Upper Body Ergometer for strength and endurance 4'/4'    Neuromuscular re-education activities to improve: Balance, Coordination, Proprioception, and Posture for 23 minutes. The following activities were included:    Supine chin tuck 10 x " "10"   Shoulder blade squeezes 5" x 15  Wall slide 2 x 10  No money red band 3 x 10    Therapeutic activities to improve functional performance for 0 minutes, including:      Patient Education and Home Exercises     Home Exercises Provided and Patient Education Provided     Education provided:   - Home Exercise Program     Written Home Exercises Provided: Patient instructed to cont prior HEP. Exercises were reviewed and Hernan was able to demonstrate them prior to the end of the session.  Hernan demonstrated good  understanding of the education provided. See EMR under Patient Instructions for exercises provided during therapy sessions    ASSESSMENT     Hernan presented with low level symptoms today. She had some increase in tingling in her arm with most interventions, though not significant. We focused on thoracic mobility and Upper extremity strength today. Will progress as able.    Hernan is progressing well towards her goals.     Pt prognosis is Good.     Pt will continue to benefit from skilled outpatient physical therapy to address the deficits listed in the problem list box on initial evaluation, provide pt/family education and to maximize pt's level of independence in the home and community environment.     Pt's spiritual, cultural and educational needs considered and pt agreeable to plan of care and goals.     Anticipated barriers to physical therapy: age, BMI, co-morbidities     Goals:   Short Term Goals: 6 weeks -Progressing, not met   Patient will be independent in Home Exercise Program.  Patient will have improved cervical motion by 5 degrees in rotation.  Patient will have centralized symptoms proximal to elbow.     Long Term Goals: 12 weeks -Progressing, not met   Patient will be independent in progressed Home Exercise Program.  Patient will have decreased pain with Internal Rotation.  Patient goal:be pain free as much as possible  Patient will show improved FOTO limitation score to predicted level to show " true functional improvement.     PLAN   Plan of care Certification: 11/16/2023 to 2/8/2024.     Progress as tolerated with emphasis on thoracic mobility, posterior shoulder mobility, and strengthening.    Oralia Medina, PT , DPT

## 2023-12-11 ENCOUNTER — CLINICAL SUPPORT (OUTPATIENT)
Dept: REHABILITATION | Facility: HOSPITAL | Age: 58
End: 2023-12-11
Payer: COMMERCIAL

## 2023-12-11 DIAGNOSIS — M25.60 RANGE OF MOTION DEFICIT: Primary | ICD-10-CM

## 2023-12-11 DIAGNOSIS — R29.3 POSTURE ABNORMALITY: ICD-10-CM

## 2023-12-11 PROCEDURE — 97112 NEUROMUSCULAR REEDUCATION: CPT | Mod: PN

## 2023-12-11 PROCEDURE — 97110 THERAPEUTIC EXERCISES: CPT | Mod: PN

## 2023-12-11 NOTE — PROGRESS NOTES
"[facility]   Physical Therapy Treatment Note     Name: Coral Gerard  [facility] Number: [id number]    Therapy Diagnosis:   Encounter Diagnoses   Name Primary?    Range of motion deficit Yes    Posture abnormality        Physician: Torres Jimenez MD    Visit Date: 12/11/2023    Physician Orders: PT Eval and treat   Medical Diagnosis from Referral:   Diagnosis   M75.42 (ICD-10-CM) - Impingement syndrome of left shoulder      Evaluation Date: 11/16/2023  Authorization Period Expiration: 12/31/2023   Plan of Care Expiration: 2/8/2024  Progress Note Due: 12/16/2023  Visit # / Visits authorized: 2/ 20  FOTO: 1/ 3    FOTO 1st:   FOTO 3rd:  FOTO 10th:    Time in: 100pm  Time out: 153pm  Total Billable Time: 53 minutes    Precautions:  Standard      SUBJECTIVE     Pt reports: feeling good today. Only slight discomfort.    She was compliant with home exercise program.  Response to previous treatment: ongoing  Functional change: ongoing    Pain: 1/10  Location: left Upper extremity      OBJECTIVE     Objective Measures updated at progress report unless specified.     Treatment       Hernan received the treatments listed below:      Manual therapy techniques: Joint mobilizations and Soft tissue Mobilization were applied to the: neck and left Upper extremity for 0 minutes, including:    Left shoulder Inferior and posterior mobilization grade III  Suboccipital distraction and release grade III  Prone CTJ gapping grade III (patient not comfortable in prone)    Therapeutic exercises to develop strength, endurance, ROM, flexibility, posture, and core stabilization for 25 minutes including:    Side Lying open book x 15 Bilateral   Seated thoracic extension 5" x 15  Upper Body Ergometer for strength and endurance 4'/4'  Serratus landmine 2 x 15 Bilateral     Neuromuscular re-education activities to improve: Balance, Coordination, Proprioception, and Posture for 25 minutes. The following activities were " "included:    Supine chin tuck 10 x 10"   Shoulder blade squeezes 5" x 15  Wall slide 2 x 10  No money red band 3 x 10  Median nerve sliders x 25 Bilateral     Therapeutic activities to improve functional performance for 0 minutes, including:      Patient Education and Home Exercises     Home Exercises Provided and Patient Education Provided     Education provided:   - Home Exercise Program     Written Home Exercises Provided: Patient instructed to cont prior HEP. Exercises were reviewed and Hernan was able to demonstrate them prior to the end of the session.  Hernan demonstrated good  understanding of the education provided. See EMR under Patient Instructions for exercises provided during therapy sessions    ASSESSMENT     Hernan presented with low level symptoms today. She had some increase in tingling with a few exercises but overall was able to tolerate interventions well. Will progress as able.    Hernan is progressing well towards her goals.     Pt prognosis is Good.     Pt will continue to benefit from skilled outpatient physical therapy to address the deficits listed in the problem list box on initial evaluation, provide pt/family education and to maximize pt's level of independence in the home and community environment.     Pt's spiritual, cultural and educational needs considered and pt agreeable to plan of care and goals.     Anticipated barriers to physical therapy: age, BMI, co-morbidities     Goals:   Short Term Goals: 6 weeks -Progressing, not met   Patient will be independent in Home Exercise Program.  Patient will have improved cervical motion by 5 degrees in rotation.  Patient will have centralized symptoms proximal to elbow.     Long Term Goals: 12 weeks -Progressing, not met   Patient will be independent in progressed Home Exercise Program.  Patient will have decreased pain with Internal Rotation.  Patient goal:be pain free as much as possible  Patient will show improved FOTO limitation score to " predicted level to show true functional improvement.     PLAN   Plan of care Certification: 11/16/2023 to 2/8/2024.     Progress as tolerated with emphasis on thoracic mobility, posterior shoulder mobility, and strengthening.    Oralia Mdeina, PT , DPT

## 2024-04-09 ENCOUNTER — OFFICE VISIT (OUTPATIENT)
Dept: FAMILY MEDICINE | Facility: CLINIC | Age: 59
End: 2024-04-09
Payer: COMMERCIAL

## 2024-04-09 VITALS
HEART RATE: 91 BPM | DIASTOLIC BLOOD PRESSURE: 70 MMHG | HEIGHT: 64 IN | BODY MASS INDEX: 28 KG/M2 | WEIGHT: 164 LBS | OXYGEN SATURATION: 98 % | SYSTOLIC BLOOD PRESSURE: 122 MMHG

## 2024-04-09 DIAGNOSIS — Z79.899 HIGH RISK MEDICATION USE: ICD-10-CM

## 2024-04-09 DIAGNOSIS — E78.5 HYPERLIPIDEMIA, UNSPECIFIED HYPERLIPIDEMIA TYPE: ICD-10-CM

## 2024-04-09 DIAGNOSIS — G43.909 MIGRAINE WITHOUT STATUS MIGRAINOSUS, NOT INTRACTABLE, UNSPECIFIED MIGRAINE TYPE: ICD-10-CM

## 2024-04-09 DIAGNOSIS — Z78.0 MENOPAUSE: ICD-10-CM

## 2024-04-09 DIAGNOSIS — Z00.00 PHYSICAL EXAM: Primary | ICD-10-CM

## 2024-04-09 PROCEDURE — 99396 PREV VISIT EST AGE 40-64: CPT | Mod: S$GLB,,, | Performed by: NURSE PRACTITIONER

## 2024-04-09 PROCEDURE — 1160F RVW MEDS BY RX/DR IN RCRD: CPT | Mod: CPTII,S$GLB,, | Performed by: NURSE PRACTITIONER

## 2024-04-09 PROCEDURE — 1159F MED LIST DOCD IN RCRD: CPT | Mod: CPTII,S$GLB,, | Performed by: NURSE PRACTITIONER

## 2024-04-09 PROCEDURE — 3074F SYST BP LT 130 MM HG: CPT | Mod: CPTII,S$GLB,, | Performed by: NURSE PRACTITIONER

## 2024-04-09 PROCEDURE — 3078F DIAST BP <80 MM HG: CPT | Mod: CPTII,S$GLB,, | Performed by: NURSE PRACTITIONER

## 2024-04-09 PROCEDURE — 3008F BODY MASS INDEX DOCD: CPT | Mod: CPTII,S$GLB,, | Performed by: NURSE PRACTITIONER

## 2024-04-09 RX ORDER — LORATADINE 10 MG/1
10 TABLET ORAL DAILY
COMMUNITY

## 2024-04-09 RX ORDER — ESOMEPRAZOLE MAGNESIUM 40 MG/1
40 CAPSULE, DELAYED RELEASE ORAL
COMMUNITY

## 2024-04-09 RX ORDER — RIZATRIPTAN BENZOATE 10 MG/1
10 TABLET ORAL
COMMUNITY

## 2024-04-09 RX ORDER — ROSUVASTATIN CALCIUM 10 MG/1
10 TABLET, COATED ORAL DAILY
Qty: 90 TABLET | Refills: 3 | Status: SHIPPED | OUTPATIENT
Start: 2024-04-09 | End: 2025-04-09

## 2024-04-09 RX ORDER — DICYCLOMINE HYDROCHLORIDE 10 MG/1
10 CAPSULE ORAL
COMMUNITY

## 2024-04-09 RX ORDER — ESTRADIOL 0.1 MG/G
CREAM VAGINAL DAILY
COMMUNITY

## 2024-04-09 RX ORDER — DICLOFENAC SODIUM 10 MG/G
2 GEL TOPICAL 4 TIMES DAILY
COMMUNITY

## 2024-04-09 NOTE — PROGRESS NOTES
SUBJECTIVE:    Patient ID: Coral Gerard is a 59 y.o. female.    Chief Complaint: Annual Exam (Bottles brought//Pt here for annual//JL)    59 year old female presents for check up. Treated for hyperlipidemia, migraines and menopause. Taking crestor as prescribed. Due for labs. Since last visit saw dr. Jimenez for shoulder and elbow pain. Completed physical therapy. Did get significant relief. Last mammogram 11/23. Cologuard negative 2023. Stays active. No additional complaints today.         No visits with results within 6 Month(s) from this visit.   Latest known visit with results is:   Office Visit on 10/09/2023   Component Date Value Ref Range Status    WBC 10/09/2023 7.4  3.8 - 10.8 Thousand/uL Final    RBC 10/09/2023 4.62  3.80 - 5.10 Million/uL Final    Hemoglobin 10/09/2023 13.9  11.7 - 15.5 g/dL Final    Hematocrit 10/09/2023 42.6  35.0 - 45.0 % Final    MCV 10/09/2023 92.2  80.0 - 100.0 fL Final    MCH 10/09/2023 30.1  27.0 - 33.0 pg Final    MCHC 10/09/2023 32.6  32.0 - 36.0 g/dL Final    RDW 10/09/2023 13.7  11.0 - 15.0 % Final    Platelets 10/09/2023 236  140 - 400 Thousand/uL Final    MPV 10/09/2023 11.4  7.5 - 12.5 fL Final    Neutrophils, Abs 10/09/2023 4,965  1,500 - 7,800 cells/uL Final    Lymph # 10/09/2023 1,695  850 - 3,900 cells/uL Final    Mono # 10/09/2023 636  200 - 950 cells/uL Final    Eos # 10/09/2023 67  15 - 500 cells/uL Final    Baso # 10/09/2023 37  0 - 200 cells/uL Final    Neutrophils Relative 10/09/2023 67.1  % Final    Lymph % 10/09/2023 22.9  % Final    Mono % 10/09/2023 8.6  % Final    Eosinophil % 10/09/2023 0.9  % Final    Basophil % 10/09/2023 0.5  % Final    Glucose 10/09/2023 90  65 - 99 mg/dL Final    BUN 10/09/2023 20  7 - 25 mg/dL Final    Creatinine 10/09/2023 0.66  0.50 - 1.05 mg/dL Final    eGFR 10/09/2023 100  > OR = 60 mL/min/1.73m2 Final    BUN/Creatinine Ratio 10/09/2023 SEE NOTE:  6 - 22 (calc) Final    Sodium 10/09/2023 139  135 - 146 mmol/L Final    Potassium  10/09/2023 4.1  3.5 - 5.3 mmol/L Final    Chloride 10/09/2023 100  98 - 110 mmol/L Final    CO2 10/09/2023 26  20 - 32 mmol/L Final    Calcium 10/09/2023 10.0  8.6 - 10.4 mg/dL Final    Total Protein 10/09/2023 6.8  6.1 - 8.1 g/dL Final    Albumin 10/09/2023 4.4  3.6 - 5.1 g/dL Final    Globulin, Total 10/09/2023 2.4  1.9 - 3.7 g/dL (calc) Final    Albumin/Globulin Ratio 10/09/2023 1.8  1.0 - 2.5 (calc) Final    Total Bilirubin 10/09/2023 0.7  0.2 - 1.2 mg/dL Final    Alkaline Phosphatase 10/09/2023 74  37 - 153 U/L Final    AST 10/09/2023 11  10 - 35 U/L Final    ALT 10/09/2023 18  6 - 29 U/L Final    Cholesterol 10/09/2023 287 (H)  <200 mg/dL Final    HDL 10/09/2023 40 (L)  > OR = 50 mg/dL Final    Triglycerides 10/09/2023 732 (H)  <150 mg/dL Final    LDL Cholesterol 10/09/2023   mg/dL (calc) Final    HDL/Cholesterol Ratio 10/09/2023 7.2 (H)  <5.0 (calc) Final    Non HDL Chol. (LDL+VLDL) 10/09/2023 247 (H)  <130 mg/dL (calc) Final    TSH w/reflex to FT4 10/09/2023 1.52  0.40 - 4.50 mIU/L Final    Creatinine, Urine 10/09/2023 56  20 - 275 mg/dL Final    Microalb, Ur 10/09/2023 0.3  See Note: mg/dL Final    Microalb/Creat Ratio 10/09/2023 5  <30 mcg/mg creat Final    Color, UA 10/09/2023 YELLOW  YELLOW Final    Appearance, UA 10/09/2023 CLEAR  CLEAR Final    Specific Gravity, UA 10/09/2023 1.013  1.001 - 1.035 Final    pH, UA 10/09/2023 6.0  5.0 - 8.0 Final    Glucose, UA 10/09/2023 NEGATIVE  NEGATIVE Final    Bilirubin, UA 10/09/2023 NEGATIVE  NEGATIVE Final    Ketones, UA 10/09/2023 NEGATIVE  NEGATIVE Final    Occult Blood UA 10/09/2023 NEGATIVE  NEGATIVE Final    Protein, UA 10/09/2023 NEGATIVE  NEGATIVE Final    Nitrite, UA 10/09/2023 NEGATIVE  NEGATIVE Final    Leukocytes, UA 10/09/2023 NEGATIVE  NEGATIVE Final    WBC Casts, UA 10/09/2023 NONE SEEN  < OR = 5 /HPF Final    RBC Casts, UA 10/09/2023 NONE SEEN  < OR = 2 /HPF Final    Squam Epithel, UA 10/09/2023 NONE SEEN  < OR = 5 /HPF Final    Bacteria, UA  10/09/2023 NONE SEEN  NONE SEEN /HPF Final    Hyaline Casts, UA 10/09/2023 NONE SEEN  NONE SEEN /LPF Final    Service Cmt: 10/09/2023    Final    Reflexive Urine Culture 10/09/2023    Final    Cologuard Result 10/23/2023 Negative  Negative Final       Past Medical History:   Diagnosis Date    Anxiety     Palpitations      Social History     Socioeconomic History    Marital status:    Tobacco Use    Smoking status: Former     Types: Cigarettes    Smokeless tobacco: Never   Substance and Sexual Activity    Alcohol use: Yes     Alcohol/week: 14.0 standard drinks of alcohol     Types: 14 Glasses of wine per week    Drug use: Never   Social History Narrative    Worked as . Not currently working    Gets about 7 hours of sleep per night    No formal exercise.     Works in yard, and pool.      Past Surgical History:   Procedure Laterality Date    BREAST BIOPSY      BREAST CYST ASPIRATION       SECTION       SECTION N/A     CHOLECYSTECTOMY N/A     HYSTERECTOMY       Family History   Adopted: Yes   Problem Relation Name Age of Onset    Diabetes Mother      Diabetes Father         All of your core healthy metrics are met.      Review of patient's allergies indicates:   Allergen Reactions    Adhesive      Paper tape ok    Chlorpheniramine-pseudoephed      anphylaxis    Demerol (pf) [meperidine (pf)] Itching    Ecklonia kurome      Anaphylaxis from iodine dye       Current Outpatient Medications:     aluminum chloride (DRYSOL) 20 % external solution, Apply topically every evening., Disp: , Rfl:     diclofenac sodium (VOLTAREN) 1 % Gel, Apply 2 g topically 4 (four) times daily., Disp: , Rfl:     dicyclomine (BENTYL) 10 MG capsule, Take 10 mg by mouth 4 (four) times daily before meals and nightly., Disp: , Rfl:     esomeprazole (NEXIUM) 40 MG capsule, Take 40 mg by mouth before breakfast., Disp: , Rfl:     estradioL (ESTRACE) 0.01 % (0.1 mg/gram) vaginal cream, Place vaginally once daily., Disp: ,  "Rfl:     loratadine (CLARITIN) 10 mg tablet, Take 10 mg by mouth once daily., Disp: , Rfl:     mupirocin (BACTROBAN) 2 % ointment, Apply topically 3 (three) times daily., Disp: 22 g, Rfl: 0    rizatriptan (MAXALT) 10 MG tablet, Take 10 mg by mouth as needed for Migraine., Disp: , Rfl:     omega-3 acid ethyl esters (LOVAZA) 1 gram capsule, Take 2 capsules (2 g total) by mouth 2 (two) times daily. (Patient not taking: Reported on 4/9/2024), Disp: 360 capsule, Rfl: 3    rosuvastatin (CRESTOR) 10 MG tablet, Take 1 tablet (10 mg total) by mouth once daily., Disp: 90 tablet, Rfl: 3    Review of Systems   Constitutional:  Negative for chills, fever and unexpected weight change.   HENT:  Negative for ear pain, rhinorrhea and sore throat.    Eyes:  Negative for pain and visual disturbance.   Respiratory:  Negative for cough and shortness of breath.    Cardiovascular:  Negative for chest pain, palpitations and leg swelling.   Gastrointestinal:  Negative for abdominal pain, diarrhea, nausea and vomiting.   Genitourinary:  Negative for difficulty urinating, hematuria and vaginal bleeding.   Musculoskeletal:  Negative for arthralgias.   Skin:  Negative for rash.   Neurological:  Negative for dizziness, weakness and headaches.   Psychiatric/Behavioral:  Negative for agitation and sleep disturbance. The patient is not nervous/anxious.           Objective:      Vitals:    04/09/24 1321   BP: 122/70   Pulse: 91   SpO2: 98%   Weight: 74.4 kg (164 lb)   Height: 5' 4" (1.626 m)     Physical Exam  Vitals and nursing note reviewed.   Constitutional:       General: She is not in acute distress.     Appearance: Normal appearance. She is well-developed.   HENT:      Head: Normocephalic.      Right Ear: External ear normal.      Left Ear: External ear normal.   Eyes:      Conjunctiva/sclera: Conjunctivae normal.      Pupils: Pupils are equal, round, and reactive to light.   Neck:      Vascular: No JVD.   Cardiovascular:      Rate and Rhythm: " Normal rate and regular rhythm.      Heart sounds: No murmur heard.  Pulmonary:      Effort: Pulmonary effort is normal.      Breath sounds: Normal breath sounds.   Abdominal:      General: Bowel sounds are normal.      Palpations: Abdomen is soft.   Musculoskeletal:         General: No deformity. Normal range of motion.      Cervical back: Normal range of motion and neck supple.   Lymphadenopathy:      Cervical: No cervical adenopathy.   Skin:     General: Skin is warm and dry.      Findings: No rash.   Neurological:      Mental Status: She is alert and oriented to person, place, and time.      Gait: Gait normal.   Psychiatric:         Speech: Speech normal.         Behavior: Behavior normal.           Assessment:       1. Physical exam    2. High risk medication use    3. Migraine without status migrainosus, not intractable, unspecified migraine type    4. Hyperlipidemia, unspecified hyperlipidemia type    5. Menopause         Plan:       Physical exam  Comments:  labs  Orders:  -     rosuvastatin (CRESTOR) 10 MG tablet; Take 1 tablet (10 mg total) by mouth once daily.  Dispense: 90 tablet; Refill: 3  -     CBC Auto Differential; Future; Expected date: 04/09/2024  -     Comprehensive Metabolic Panel; Future; Expected date: 04/09/2024  -     Lipid Panel; Future; Expected date: 04/09/2024  -     TSH w/reflex to FT4; Future; Expected date: 04/09/2024  -     Microalbumin/Creatinine Ratio, Urine; Future; Expected date: 04/09/2024  -     Urinalysis, Reflex to Urine Culture Urine, Clean Catch; Future; Expected date: 04/09/2024    High risk medication use  -     rosuvastatin (CRESTOR) 10 MG tablet; Take 1 tablet (10 mg total) by mouth once daily.  Dispense: 90 tablet; Refill: 3  -     CBC Auto Differential; Future; Expected date: 04/09/2024  -     Comprehensive Metabolic Panel; Future; Expected date: 04/09/2024  -     Lipid Panel; Future; Expected date: 04/09/2024  -     TSH w/reflex to FT4; Future; Expected date:  04/09/2024  -     Microalbumin/Creatinine Ratio, Urine; Future; Expected date: 04/09/2024  -     Urinalysis, Reflex to Urine Culture Urine, Clean Catch; Future; Expected date: 04/09/2024    Migraine without status migrainosus, not intractable, unspecified migraine type  Comments:  maxalt  Orders:  -     rosuvastatin (CRESTOR) 10 MG tablet; Take 1 tablet (10 mg total) by mouth once daily.  Dispense: 90 tablet; Refill: 3  -     CBC Auto Differential; Future; Expected date: 04/09/2024  -     Comprehensive Metabolic Panel; Future; Expected date: 04/09/2024  -     Lipid Panel; Future; Expected date: 04/09/2024  -     TSH w/reflex to FT4; Future; Expected date: 04/09/2024  -     Microalbumin/Creatinine Ratio, Urine; Future; Expected date: 04/09/2024  -     Urinalysis, Reflex to Urine Culture Urine, Clean Catch; Future; Expected date: 04/09/2024    Hyperlipidemia, unspecified hyperlipidemia type  Comments:  crestor  Orders:  -     rosuvastatin (CRESTOR) 10 MG tablet; Take 1 tablet (10 mg total) by mouth once daily.  Dispense: 90 tablet; Refill: 3  -     CBC Auto Differential; Future; Expected date: 04/09/2024  -     Comprehensive Metabolic Panel; Future; Expected date: 04/09/2024  -     Lipid Panel; Future; Expected date: 04/09/2024  -     TSH w/reflex to FT4; Future; Expected date: 04/09/2024  -     Microalbumin/Creatinine Ratio, Urine; Future; Expected date: 04/09/2024  -     Urinalysis, Reflex to Urine Culture Urine, Clean Catch; Future; Expected date: 04/09/2024    Menopause  Comments:  estradiol      Follow up in about 1 year (around 4/9/2025), or if symptoms worsen or fail to improve, for medication management.        4/28/2024 Milla Floyd

## 2024-05-28 ENCOUNTER — TELEPHONE (OUTPATIENT)
Dept: FAMILY MEDICINE | Facility: CLINIC | Age: 59
End: 2024-05-28
Payer: COMMERCIAL

## 2024-06-12 LAB
ALBUMIN SERPL-MCNC: 4.4 G/DL (ref 3.6–5.1)
ALBUMIN/GLOB SERPL: 2.1 (CALC) (ref 1–2.5)
ALP SERPL-CCNC: 62 U/L (ref 37–153)
ALT SERPL-CCNC: 18 U/L (ref 6–29)
AST SERPL-CCNC: 13 U/L (ref 10–35)
BASOPHILS # BLD AUTO: 33 CELLS/UL (ref 0–200)
BASOPHILS NFR BLD AUTO: 0.5 %
BILIRUB SERPL-MCNC: 0.8 MG/DL (ref 0.2–1.2)
BUN SERPL-MCNC: 18 MG/DL (ref 7–25)
BUN/CREAT SERPL: NORMAL (CALC) (ref 6–22)
CALCIUM SERPL-MCNC: 9.5 MG/DL (ref 8.6–10.4)
CHLORIDE SERPL-SCNC: 105 MMOL/L (ref 98–110)
CHOLEST SERPL-MCNC: 170 MG/DL
CHOLEST/HDLC SERPL: 3.8 (CALC)
CO2 SERPL-SCNC: 27 MMOL/L (ref 20–32)
CREAT SERPL-MCNC: 0.69 MG/DL (ref 0.5–1.05)
EGFR: 99 ML/MIN/1.73M2
EOSINOPHIL # BLD AUTO: 72 CELLS/UL (ref 15–500)
EOSINOPHIL NFR BLD AUTO: 1.1 %
ERYTHROCYTE [DISTWIDTH] IN BLOOD BY AUTOMATED COUNT: 13.7 % (ref 11–15)
GLOBULIN SER CALC-MCNC: 2.1 G/DL (CALC) (ref 1.9–3.7)
GLUCOSE SERPL-MCNC: 81 MG/DL (ref 65–99)
HCT VFR BLD AUTO: 43 % (ref 35–45)
HDLC SERPL-MCNC: 45 MG/DL
HGB BLD-MCNC: 14.2 G/DL (ref 11.7–15.5)
LDLC SERPL CALC-MCNC: 96 MG/DL (CALC)
LYMPHOCYTES # BLD AUTO: 1528 CELLS/UL (ref 850–3900)
LYMPHOCYTES NFR BLD AUTO: 23.5 %
MCH RBC QN AUTO: 30.6 PG (ref 27–33)
MCHC RBC AUTO-ENTMCNC: 33 G/DL (ref 32–36)
MCV RBC AUTO: 92.7 FL (ref 80–100)
MONOCYTES # BLD AUTO: 462 CELLS/UL (ref 200–950)
MONOCYTES NFR BLD AUTO: 7.1 %
NEUTROPHILS # BLD AUTO: 4407 CELLS/UL (ref 1500–7800)
NEUTROPHILS NFR BLD AUTO: 67.8 %
NONHDLC SERPL-MCNC: 125 MG/DL (CALC)
PLATELET # BLD AUTO: 215 THOUSAND/UL (ref 140–400)
PMV BLD REES-ECKER: 11.5 FL (ref 7.5–12.5)
POTASSIUM SERPL-SCNC: 4.1 MMOL/L (ref 3.5–5.3)
PROT SERPL-MCNC: 6.5 G/DL (ref 6.1–8.1)
RBC # BLD AUTO: 4.64 MILLION/UL (ref 3.8–5.1)
SODIUM SERPL-SCNC: 141 MMOL/L (ref 135–146)
TRIGL SERPL-MCNC: 202 MG/DL
TSH SERPL-ACNC: 1.61 MIU/L (ref 0.4–4.5)
WBC # BLD AUTO: 6.5 THOUSAND/UL (ref 3.8–10.8)

## 2024-07-29 ENCOUNTER — TELEPHONE (OUTPATIENT)
Dept: FAMILY MEDICINE | Facility: CLINIC | Age: 59
End: 2024-07-29
Payer: COMMERCIAL

## 2024-07-29 ENCOUNTER — OFFICE VISIT (OUTPATIENT)
Dept: URGENT CARE | Facility: CLINIC | Age: 59
End: 2024-07-29
Payer: COMMERCIAL

## 2024-07-29 VITALS
SYSTOLIC BLOOD PRESSURE: 125 MMHG | WEIGHT: 150 LBS | BODY MASS INDEX: 25.61 KG/M2 | HEART RATE: 84 BPM | OXYGEN SATURATION: 100 % | RESPIRATION RATE: 18 BRPM | TEMPERATURE: 98 F | DIASTOLIC BLOOD PRESSURE: 82 MMHG | HEIGHT: 64 IN

## 2024-07-29 DIAGNOSIS — R30.0 DYSURIA: Primary | ICD-10-CM

## 2024-07-29 DIAGNOSIS — N30.01 ACUTE CYSTITIS WITH HEMATURIA: ICD-10-CM

## 2024-07-29 LAB
BILIRUB UR QL STRIP: NEGATIVE
GLUCOSE UR QL STRIP: NEGATIVE
KETONES UR QL STRIP: NEGATIVE
LEUKOCYTE ESTERASE UR QL STRIP: NEGATIVE
PH, POC UA: 6
POC BLOOD, URINE: POSITIVE
POC NITRATES, URINE: NEGATIVE
PROT UR QL STRIP: NEGATIVE
SP GR UR STRIP: 1.01 (ref 1–1.03)
UROBILINOGEN UR STRIP-ACNC: 0.2 (ref 0.1–1.1)

## 2024-07-29 PROCEDURE — 99204 OFFICE O/P NEW MOD 45 MIN: CPT | Mod: S$GLB,,, | Performed by: STUDENT IN AN ORGANIZED HEALTH CARE EDUCATION/TRAINING PROGRAM

## 2024-07-29 PROCEDURE — 81003 URINALYSIS AUTO W/O SCOPE: CPT | Mod: QW,S$GLB,, | Performed by: STUDENT IN AN ORGANIZED HEALTH CARE EDUCATION/TRAINING PROGRAM

## 2024-07-29 RX ORDER — PHENAZOPYRIDINE HYDROCHLORIDE 200 MG/1
200 TABLET, FILM COATED ORAL 3 TIMES DAILY PRN
Qty: 9 TABLET | Refills: 0 | Status: SHIPPED | OUTPATIENT
Start: 2024-07-29

## 2024-07-29 RX ORDER — NITROFURANTOIN 25; 75 MG/1; MG/1
100 CAPSULE ORAL 2 TIMES DAILY
Qty: 10 CAPSULE | Refills: 0 | Status: SHIPPED | OUTPATIENT
Start: 2024-07-29 | End: 2024-08-03

## 2024-07-29 NOTE — TELEPHONE ENCOUNTER
Spoke to pt and she thinks she may have a UTI or kidney infection, lower back pain and urgency and burning. Pt stated started a few days ago. Has not taken an Azo's. UA dip?? Leaving this week and going out of town for a month

## 2024-07-29 NOTE — PROGRESS NOTES
"Subjective:      Patient ID: Coral Gerard is a 59 y.o. female.    Vitals:  height is 5' 4" (1.626 m) and weight is 68 kg (150 lb). Her temperature is 97.5 °F (36.4 °C). Her blood pressure is 125/82 and her pulse is 84. Her respiration is 18 and oxygen saturation is 100%.     Chief Complaint: Dysuria    Patient is a 59-year-old female who presents to clinic for evaluation of possible UTI.  Patient reports history of UTIs prior to hysterectomy and symptoms do feel similar to that.  Patient reports no recurrent or frequent UTI after hysterectomy.  Patient states that symptoms started last week.  Patient states that she had 1 episode of dysuria.  Patient states then developed some lower back pain and intermittent nausea.  Patient states that she typically does not have normal UTI symptoms whenever she did have UTIs.  Patient states she believes she may have 1 now and is going to leave out of town so she would like to see if she can get a urine ran.  Patient states that she has not experienced any urinary frequency or urgency, urinary hesitancy or retention, flank pain or pelvic pain, vaginal discharge or bleeding, vaginal odor, vomiting, diarrhea or constipation, fever or chills.  Patient states no over-the-counter medications for symptoms at this point.    Dysuria   This is a new problem. The current episode started in the past 7 days. The quality of the pain is described as aching. The pain is at a severity of 9/10. The pain is moderate. Associated symptoms include nausea. Pertinent negatives include no chills, flank pain, frequency, urgency, vomiting, constipation or rash. Associated symptoms comments: Burning, back pain with nausea,. She has tried increased fluids for the symptoms. The treatment provided no relief.       Constitution: Negative. Negative for chills, sweating, fatigue and fever.   HENT: Negative.     Neck: neck negative.   Cardiovascular: Negative.  Negative for chest pain and palpitations.   Eyes: " Negative.    Respiratory: Negative.  Negative for chest tightness, cough and shortness of breath.    Gastrointestinal:  Positive for nausea. Negative for abdominal pain, vomiting, constipation and diarrhea.   Endocrine: negative.   Genitourinary:  Positive for dysuria (Reports 1 episode last week). Negative for frequency, urgency, urine decreased, flank pain, vaginal discharge, vaginal bleeding, vaginal odor and pelvic pain.   Musculoskeletal:  Positive for back pain (Low back).   Skin: Negative.  Negative for color change, pale, rash and erythema.   Allergic/Immunologic: Negative.    Neurological: Negative.  Negative for dizziness, light-headedness, passing out, headaches, disorientation and altered mental status.   Hematologic/Lymphatic: Negative.    Psychiatric/Behavioral: Negative.  Negative for altered mental status, disorientation and confusion.       Objective:     Physical Exam   Constitutional: She is oriented to person, place, and time. She appears well-developed. She is cooperative.  Non-toxic appearance. She does not appear ill. No distress.   HENT:   Head: Normocephalic and atraumatic.   Ears:   Right Ear: Hearing and external ear normal.   Left Ear: Hearing and external ear normal.   Nose: Nose normal. No mucosal edema or nasal deformity. No epistaxis. Right sinus exhibits no maxillary sinus tenderness and no frontal sinus tenderness. Left sinus exhibits no maxillary sinus tenderness and no frontal sinus tenderness.   Mouth/Throat: Uvula is midline, oropharynx is clear and moist and mucous membranes are normal. No trismus in the jaw. Normal dentition. No uvula swelling.   Eyes: Conjunctivae and lids are normal. Pupils are equal, round, and reactive to light.   Neck: Trachea normal and phonation normal. Neck supple.   Cardiovascular: Normal rate, regular rhythm and normal pulses.   Pulmonary/Chest: Effort normal and breath sounds normal. No respiratory distress. She has no wheezes. She has no rhonchi.  She has no rales.   Abdominal: Normal appearance and bowel sounds are normal. She exhibits no distension. Soft. There is no abdominal tenderness. There is no rebound, no guarding, no left CVA tenderness and no right CVA tenderness.   Musculoskeletal: Normal range of motion.         General: Normal range of motion.   Neurological: She is alert and oriented to person, place, and time. She exhibits normal muscle tone.   Skin: Skin is warm, dry, intact, not diaphoretic, not pale and no rash. Capillary refill takes 2 to 3 seconds. No erythema   Psychiatric: Her speech is normal and behavior is normal. Judgment and thought content normal.   Nursing note and vitals reviewed.      Assessment:     1. Dysuria    2. Acute cystitis with hematuria        Plan:       Dysuria  -     Cancel: POCT URINALYSIS  -     POCT Urinalysis, Dipstick, Manual, W/O Scope  -     CULTURE, URINE    Acute cystitis with hematuria    Other orders  -     nitrofurantoin, macrocrystal-monohydrate, (MACROBID) 100 MG capsule; Take 1 capsule (100 mg total) by mouth 2 (two) times daily. for 5 days  Dispense: 10 capsule; Refill: 0  -     phenazopyridine (PYRIDIUM) 200 MG tablet; Take 1 tablet (200 mg total) by mouth 3 (three) times daily as needed for Pain.  Dispense: 9 tablet; Refill: 0                Labs: UA:  10+ blood, negative nitrate, negative leukocyte   Urine culture ordered, will call results.    Tylenol/Motrin per package instructions for any pain or fever.    Assure adequate hydration.    Take medications as prescribed.  Patient wishes to start antibiotics before receiving urine culture stating she is leaving out of town and would like something just in case and will stop later as needed.  Follow-up with PCP in 1-2 days.    Return to clinic as needed.    To ED for any new or acutely worsening symptoms.    Patient in agreement with plan of care.    DISCLAIMER: Please note that my documentation in this Electronic Healthcare Record was produced  using speech recognition software and therefore may contain errors related to that software system.These could include grammar, punctuation and spelling errors or the inclusion/exclusion of phrases that were not intended. Garbled syntax, mangled pronouns, and other bizarre constructions may be attributed to that software system.

## 2024-07-29 NOTE — TELEPHONE ENCOUNTER
----- Message from Amanda Rodriguez sent at 7/29/2024  2:06 PM CDT -----  The patient feels like she has a UTI. She is having lower back pain  and some burning. Can she come in and do a U/A? She is leaving Thursday morning to go out of town.  Pt's # [phone] GH

## 2024-07-30 ENCOUNTER — TELEPHONE (OUTPATIENT)
Dept: FAMILY MEDICINE | Facility: CLINIC | Age: 59
End: 2024-07-30

## 2024-07-30 NOTE — TELEPHONE ENCOUNTER
----- Message from Laurence Ramirez sent at 7/30/2024 10:03 AM CDT -----  Pt went to the walk in clinic since no one called her back and she is supposed to be leaving town soon.   [phone]

## 2024-08-01 LAB
BACTERIA UR CULT: ABNORMAL
BACTERIA UR CULT: ABNORMAL
OTHER ANTIBIOTIC SUSC ISLT: ABNORMAL

## 2024-08-02 ENCOUNTER — TELEPHONE (OUTPATIENT)
Dept: URGENT CARE | Facility: CLINIC | Age: 59
End: 2024-08-02
Payer: COMMERCIAL

## 2024-08-02 DIAGNOSIS — B96.20 E. COLI UTI (URINARY TRACT INFECTION): Primary | ICD-10-CM

## 2024-08-02 DIAGNOSIS — N39.0 E. COLI UTI (URINARY TRACT INFECTION): Primary | ICD-10-CM

## 2024-08-02 NOTE — TELEPHONE ENCOUNTER
Spoke with patient regarding urine culture results.  Patient positive for E coli.  Patient previously treated with E coli which is susceptible.  Patient reports symptoms improving.  Patient has no further questions or concerns at this point.

## 2024-10-30 ENCOUNTER — TELEPHONE (OUTPATIENT)
Dept: FAMILY MEDICINE | Facility: CLINIC | Age: 59
End: 2024-10-30
Payer: COMMERCIAL

## 2024-12-13 ENCOUNTER — HOSPITAL ENCOUNTER (OUTPATIENT)
Dept: RADIOLOGY | Facility: HOSPITAL | Age: 59
Discharge: HOME | End: 2024-12-13
Attending: NURSE PRACTITIONER
Payer: COMMERCIAL

## 2024-12-13 DIAGNOSIS — Z12.31 OTHER SCREENING MAMMOGRAM: ICD-10-CM

## 2024-12-13 PROCEDURE — 77067 SCR MAMMO BI INCL CAD: CPT | Mod: 26,,, | Performed by: RADIOLOGY

## 2024-12-13 PROCEDURE — 77063 BREAST TOMOSYNTHESIS BI: CPT | Mod: 26,,, | Performed by: RADIOLOGY

## 2024-12-13 PROCEDURE — 77067 SCR MAMMO BI INCL CAD: CPT | Mod: TC,PO

## 2025-04-04 ENCOUNTER — OFFICE VISIT (OUTPATIENT)
Dept: FAMILY MEDICINE | Facility: CLINIC | Age: 60
End: 2025-04-04
Payer: COMMERCIAL

## 2025-04-04 VITALS
HEART RATE: 83 BPM | BODY MASS INDEX: 24.41 KG/M2 | SYSTOLIC BLOOD PRESSURE: 134 MMHG | TEMPERATURE: 98 F | HEIGHT: 64 IN | DIASTOLIC BLOOD PRESSURE: 82 MMHG | WEIGHT: 143 LBS | OXYGEN SATURATION: 97 %

## 2025-04-04 DIAGNOSIS — R05.8 ALLERGIC COUGH: Primary | ICD-10-CM

## 2025-04-04 DIAGNOSIS — R53.83 FATIGUE, UNSPECIFIED TYPE: ICD-10-CM

## 2025-04-04 DIAGNOSIS — R09.82 POST-NASAL DRIP: ICD-10-CM

## 2025-04-04 PROCEDURE — 1159F MED LIST DOCD IN RCRD: CPT | Mod: CPTII,S$GLB,,

## 2025-04-04 PROCEDURE — 3079F DIAST BP 80-89 MM HG: CPT | Mod: CPTII,S$GLB,,

## 2025-04-04 PROCEDURE — 99213 OFFICE O/P EST LOW 20 MIN: CPT | Mod: S$GLB,,,

## 2025-04-04 PROCEDURE — 3008F BODY MASS INDEX DOCD: CPT | Mod: CPTII,S$GLB,,

## 2025-04-04 PROCEDURE — 3075F SYST BP GE 130 - 139MM HG: CPT | Mod: CPTII,S$GLB,,

## 2025-04-04 RX ORDER — BENZONATATE 200 MG/1
200 CAPSULE ORAL 3 TIMES DAILY PRN
Qty: 30 CAPSULE | Refills: 1 | Status: SHIPPED | OUTPATIENT
Start: 2025-04-04

## 2025-04-04 RX ORDER — AZELASTINE HYDROCHLORIDE, FLUTICASONE PROPIONATE 137; 50 UG/1; UG/1
1 SPRAY, METERED NASAL 2 TIMES DAILY
Qty: 23 G | Refills: 0 | Status: SHIPPED | OUTPATIENT
Start: 2025-04-04

## 2025-04-04 RX ORDER — CHLOPHEDIANOL HCL AND PYRILAMINE MALEATE 12.5; 12.5 MG/5ML; MG/5ML
5 SOLUTION ORAL EVERY 8 HOURS PRN
Qty: 150 ML | Refills: 0 | Status: SHIPPED | OUTPATIENT
Start: 2025-04-04

## 2025-04-04 NOTE — PATIENT INSTRUCTIONS
"The Common Cold & Viral Upper Respiratory Illness (Viral URI) Page 1 of 1    Viral Upper Respiratory Illness Cocktail     What should I do if I think that I have a viral upper respiratory infection (URI)? Get plenty of rest and drink plenty of fluids. Also take one or more of the following medications:    To relieve a "stuffy," clogged nose, try the decongestant oxymetalozone 0.5% nasal spray, 2 sprays up to twice a day for a maximum duration of 3 days. It is recommended to limit use of oxymetalozone to 3 days due to the risk of rebound congestion if it is used longer than this. You can also use plain nasal saline spray. If you do not have high blood pressure or suffer from an elevated heart rate, you can take multi-symptom over the counter medication to control cough and nasal congestion, that includes the medication phenylephrine.   To suppress coughing, take dextromethorphan in an over the counter cough preparation, such as Delsym. Take as directed.  If you have a productive cough and need to help break up mucus, the ingredient you need in your over the counter medication is guaifenesin. This is commonly found in medications like Mucinex and Robitussin, for examples.  For fever and pain, Acetaminophen  is generally preferred if you have gastric reflux, cardiovascular disease, or kidney disease. Naproxen or ibuprofen can be helpful if your doctor deems you an appropriate candidate.   To help with sleep and cough at night, try diphenhydramine 25 mg tab which is an antihistamine that causes sedation and can help your symptoms, OR doxylamine, which is found in medications like Nyquil.  To relieve sore throat and cough, studies have shows that a teaspoon of honey can be helpful in some individuals. Warm, salt water gargles can be beneficial. Keeping the throat from feeling dry with a lozenge is helpful. Some people find hot beverages like tea or broth to be soothing.  If you do not have high blood pressure or suffer " "from an elevated heart rate, you can take multi-symptom over the counter medication to control cough and nasal congestion, that includes the medication phenylephrine. These typically also include the cough medications listed above, and frequently also acetaminophen, so be cautious about taking multiple medications.  If you do have high blood pressure or other cardiac disorders such as tachycardia, "A-fib," or palpitations, you should avoid medications with phenylephrine and pseudoephedrine, as these can make the conditions worse. There are several brands of drugs that are appropriate for use by patients with cardiac conditions, and these include Coricidan HBP.       Seek medical advice or treatment if:    Symptoms are getting worse after 7 days  Symptoms are unchanged or getting worse after 10 days  You experience shortness of breath or have any respiratory difficulty  You experience a high fever (> 102 F)  You develop eye pain/ swelling and/or vision changes  You develop severe head or facial pain/swelling    Did You Know That. . .  Viruses do not respond to antibiotic treatment.  Symptoms due to viral URI typically last 2 - 14 days, but some symptoms can linger for several weeks. (Most people recover in about 7-10 days.)  Productive cough or discolored nasal discharge does not necessarily require antibiotic therapy.      "

## 2025-04-07 NOTE — PROGRESS NOTES
SUBJECTIVE:    Patient ID: Coral Gerard is a 60 y.o. female.    Chief Complaint: Fatigue  Sore Throat  Aches x1 Month (- Visit 2/21 - Abx/Rxs Complete/-Taking OTC Sinus Med)    HPI  History of Present Illness    CHIEF COMPLAINT:  Coral presents today with persistent respiratory symptoms following a viral infection    HISTORY OF PRESENT ILLNESS:  She reports persistent sinus pressure and stuffiness with sputum that was previously green but is now clear. She experiences periods of improvement followed by symptom recurrence. She has difficulty keeping her throat clear with occasional achiness and coarseness, suggesting possible lingering bronchitis symptoms. She denies ear pain, pressure, or current fever. She has had frequent headaches concentrated around the sinuses. Multiple treatments including Mucinex, codeine cough syrup (which was ineffective), and Robitussin DM have been attempted. Treatment at [facility] included steroid injection, oral steroids, and Z-packs, which provided only temporary relief lasting approximately 5 days before symptom recurrence.    PAST MEDICAL HISTORY:  History significant for septic shock requiring life support.    SOCIAL HISTORY:  Former smoker who quit 25 years ago.    ALLERGIES:  Allergic to chlorphenamine and Sudafed with history of anaphylaxis requiring hospitalization and Benadryl treatment as a teenager. Major allergic reaction to contrast iodine used in medical imaging.    LABS:  History of elevated triglycerides which have since decreased on subsequent testing.      ROS:  General: -fever, -chills, +fatigue, -weight gain, -weight loss  Eyes: -vision changes, -redness, -discharge  ENT: -ear pain, +nasal congestion, +sore throat, +post nasal drip, +blockage or obstruction  Cardiovascular: -chest pain, -palpitations, -lower extremity edema  Respiratory: +cough, -shortness of breath, +sputum production  Gastrointestinal: -abdominal pain, -nausea, -vomiting, -diarrhea,  -constipation, -blood in stool  Genitourinary: -dysuria, -hematuria, -frequency  Musculoskeletal: -joint pain, -muscle pain, +body aches  Skin: -rash, -lesion  Neurological: +headache, -dizziness, -numbness, -tingling  Psychiatric: -anxiety, -depression, -sleep difficulty  Head: +migraines         No visits with results within 6 Month(s) from this visit.   Latest known visit with results is:   Office Visit on 2024   Component Date Value Ref Range Status    POC Blood, Urine 2024 Positive (A)  Negative Final    POC Bilirubin, Urine 2024 Negative  Negative Final    POC Urobilinogen, Urine 2024 0.2  0.1 - 1.1 Final    POC Ketones, Urine 2024 Negative  Negative Final    POC Protein, Urine 2024 Negative  Negative Final    POC Nitrates, Urine 2024 Negative  Negative Final    POC Glucose, Urine 2024 Negative  Negative Final    pH, UA 2024 6.0   Final    POC Specific Gravity, Urine 2024 1.015  1.003 - 1.029 Final    POC Leukocytes, Urine 2024 Negative  Negative Final    Urine Culture, Routine 2024 Final report (A)   Final    Result 1 2024 Escherichia coli (A)   Final    Antimicrobial Susceptibility 2024 Comment   Final       Past Medical History:   Diagnosis Date    Anxiety     Palpitations      Social History[1]  Past Surgical History:   Procedure Laterality Date    BREAST BIOPSY      BREAST CYST ASPIRATION       SECTION       SECTION N/A     CHOLECYSTECTOMY N/A     HYSTERECTOMY       Family History   Adopted: Yes   Problem Relation Name Age of Onset    Diabetes Mother      Diabetes Father         The 10-year CVD risk score (BABS'Agostino, et al., 2008) is: 7.8%    Values used to calculate the score:      Age: 60 years      Sex: Female      Diabetic: No      Tobacco smoker: No      Systolic Blood Pressure: 134 mmHg      Is BP treated: No      HDL Cholesterol: 45 mg/dL      Total Cholesterol: 170 mg/dL    All of your core healthy  "metrics are met.      Review of patient's allergies indicates:   Allergen Reactions    Adhesive      Paper tape ok    Chlorpheniramine-pseudoephed      anphylaxis    Demerol (pf) [meperidine (pf)] Itching    Ecklonia kurome      Anaphylaxis from iodine dye     Current Medications[2]    Review of Systems        Objective:      Vitals:    04/04/25 0956   BP: 134/82   Pulse: 83   Temp: 97.9 °F (36.6 °C)   SpO2: 97%   Weight: 64.9 kg (143 lb)   Height: 5' 4" (1.626 m)     Physical Exam  Physical Exam    Constitutional: In no acute distress. Normal appearance. Well-developed. Not ill-appearing.  HENT: Normocephalic. Atraumatic. External ears normal bilaterally. Rhinorrhea. Post nasal drip mucus in throat. No erythema.  Eyes: No scleral icterus.   Cardiovascular: Normal rate and regular rhythm.  Pulmonary: Pulmonary effort is normal. Coarse lung sounds.  Skin: Warm. Dry. Capillary refill takes less than 2 seconds.            Assessment:       1. Allergic cough    2. Post-nasal drip    3. Fatigue, unspecified type         Plan:       Allergic cough  -     pyrilamine-chlophedianoL (NINJACOF) 12.5-12.5 mg/5 mL Liqd; Take 5 mLs by mouth every 8 (eight) hours as needed (cough).  Dispense: 150 mL; Refill: 0  -     azelastine-fluticasone (DYMISTA) 137-50 mcg/spray Spry nassal spray; 1 spray by Each Nostril route 2 (two) times daily.  Dispense: 23 g; Refill: 0  -     benzonatate (TESSALON) 200 MG capsule; Take 1 capsule (200 mg total) by mouth 3 (three) times daily as needed for Cough.  Dispense: 30 capsule; Refill: 1    Post-nasal drip  -     azelastine-fluticasone (DYMISTA) 137-50 mcg/spray Spry nassal spray; 1 spray by Each Nostril route 2 (two) times daily.  Dispense: 23 g; Refill: 0    Fatigue, unspecified type      Assessment & Plan        IMPRESSION:  - Assessed lingering respiratory symptoms after initial treatment with steroids and antibiotics.  - Noted coarseness in lungs suggestive of persistent bronchitis.  - " Determined primary issue to be postnasal drip causing throat irritation and cough.  - Considered reported medication allergies when selecting treatment options.  - Opted for targeted symptom management with decongestants, cough suppressants, and nasal steroid spray.    ACUTE BRONCHITIS:  - Evaluated the patient's lungs, revealing coarseness indicative of possible ongoing bronchitis.  - Assessed that the patient may still have bronchitis based on physical exam and persistent cough and respiratory symptoms following initial treatment with steroids and antibiotics.  - Observed coarseness in lungs and swollen throat during physical exam.  - Prescribed combination of treatments including cough suppressant, nasal spray, and cough tablets to address various respiratory symptoms.  - Prescribed Ninja Cough syrup for cough suppression.  - Prescribed cough tablets (not available OTC) for use at work, noting they will not cause drowsiness or show up on drug screens.  - Clarified that Mucinex and Robitussin contain the same active ingredients (guaifenesin and dextromethorphan).    CHRONIC SINUSITIS:  - Noted persistent sinus pressure and congestion reported by the patient.  - Prescribed Sudafed (pseudoephedrine) from behind the pharmacy counter for nasal congestion.  - Prescribed nasal spray containing a steroid and antihistamine to dry up secretions.  - Recommend continued use of current OTC sinus congestion medication containing acetaminophen, guaifenesin, and phenylephrine as needed.  - Explained differences between phenylephrine and pseudoephedrine decongestants to the patient.    THROAT PAIN AND IRRITATION:  - Assessed that throat pain is likely due to postnasal drip irritation.  - Noted persistent throat irritation and inability to clear throat reported by the patient.  - Observed swollen throat during exam, indicating possible sinus drainage.  - Observed swollen throat with signs of drainage during physical exam.  -  Recommend cough suppressant and nasal spray to address throat irritation caused by postnasal drip.               Follow up in about 2 months (around 6/4/2025) for come back in June for annual and labs.        This note was generated with the assistance of ambient listening technology. Verbal consent was obtained by the patient and accompanying visitor(s) for the recording of patient appointment to facilitate this note. I attest to having reviewed and edited the generated note for accuracy, though some syntax or spelling errors may persist. Please contact the author of this note for any clarification.      4/6/2025 Jeanie Merchant         [1]   Social History  Socioeconomic History    Marital status:    Tobacco Use    Smoking status: Former     Types: Cigarettes    Smokeless tobacco: Never   Substance and Sexual Activity    Alcohol use: Yes     Alcohol/week: 14.0 standard drinks of alcohol     Types: 14 Glasses of wine per week    Drug use: Never   Social History Narrative    Worked as . Not currently working    Gets about 7 hours of sleep per night    No formal exercise.     Works in yard, and pool.    [2]   Current Outpatient Medications:     aluminum chloride (DRYSOL) 20 % external solution, Apply topically every evening., Disp: , Rfl:     diclofenac sodium (VOLTAREN) 1 % Gel, Apply 2 g topically 4 (four) times daily., Disp: , Rfl:     dicyclomine (BENTYL) 10 MG capsule, Take 10 mg by mouth 4 (four) times daily before meals and nightly., Disp: , Rfl:     esomeprazole (NEXIUM) 40 MG capsule, Take 40 mg by mouth before breakfast., Disp: , Rfl:     estradioL (ESTRACE) 0.01 % (0.1 mg/gram) vaginal cream, Place vaginally once daily., Disp: , Rfl:     loratadine (CLARITIN) 10 mg tablet, Take 10 mg by mouth once daily., Disp: , Rfl:     mupirocin (BACTROBAN) 2 % ointment, Apply topically 3 (three) times daily., Disp: 22 g, Rfl: 0    rizatriptan (MAXALT) 10 MG tablet, Take 10 mg by mouth as needed for  Migraine., Disp: , Rfl:     rosuvastatin (CRESTOR) 10 MG tablet, Take 1 tablet (10 mg total) by mouth once daily., Disp: 90 tablet, Rfl: 3    azelastine-fluticasone (DYMISTA) 137-50 mcg/spray Spry nassal spray, 1 spray by Each Nostril route 2 (two) times daily., Disp: 23 g, Rfl: 0    benzonatate (TESSALON) 200 MG capsule, Take 1 capsule (200 mg total) by mouth 3 (three) times daily as needed for Cough., Disp: 30 capsule, Rfl: 1    pyrilamine-chlophedianoL (NINJACOF) 12.5-12.5 mg/5 mL Liqd, Take 5 mLs by mouth every 8 (eight) hours as needed (cough)., Disp: 150 mL, Rfl: 0

## 2025-05-30 ENCOUNTER — TELEPHONE (OUTPATIENT)
Dept: FAMILY MEDICINE | Facility: CLINIC | Age: 60
End: 2025-05-30
Payer: COMMERCIAL

## 2025-05-30 DIAGNOSIS — E78.5 HYPERLIPIDEMIA, UNSPECIFIED HYPERLIPIDEMIA TYPE: ICD-10-CM

## 2025-05-30 DIAGNOSIS — Z79.899 HIGH RISK MEDICATION USE: ICD-10-CM

## 2025-05-30 DIAGNOSIS — Z00.00 PHYSICAL EXAM: Primary | ICD-10-CM

## 2025-05-30 DIAGNOSIS — R53.83 FATIGUE, UNSPECIFIED TYPE: ICD-10-CM

## 2025-05-30 DIAGNOSIS — Z78.0 MENOPAUSE: ICD-10-CM

## 2025-05-31 LAB
ALBUMIN SERPL-MCNC: 4.7 G/DL (ref 3.6–5.1)
ALBUMIN/GLOB SERPL: 2.5 (CALC) (ref 1–2.5)
ALP SERPL-CCNC: 56 U/L (ref 37–153)
ALT SERPL-CCNC: 17 U/L (ref 6–29)
AST SERPL-CCNC: 12 U/L (ref 10–35)
BASOPHILS # BLD AUTO: 50 CELLS/UL (ref 0–200)
BASOPHILS NFR BLD AUTO: 0.7 %
BILIRUB SERPL-MCNC: 0.9 MG/DL (ref 0.2–1.2)
BUN SERPL-MCNC: 15 MG/DL (ref 7–25)
BUN/CREAT SERPL: NORMAL (CALC) (ref 6–22)
CALCIUM SERPL-MCNC: 9.6 MG/DL (ref 8.6–10.4)
CHLORIDE SERPL-SCNC: 103 MMOL/L (ref 98–110)
CHOLEST SERPL-MCNC: 223 MG/DL
CHOLEST/HDLC SERPL: 4.4 (CALC)
CO2 SERPL-SCNC: 30 MMOL/L (ref 20–32)
CREAT SERPL-MCNC: 0.68 MG/DL (ref 0.5–1.05)
EGFR: 98 ML/MIN/1.73M2
EOSINOPHIL # BLD AUTO: 72 CELLS/UL (ref 15–500)
EOSINOPHIL NFR BLD AUTO: 1 %
ERYTHROCYTE [DISTWIDTH] IN BLOOD BY AUTOMATED COUNT: 13.4 % (ref 11–15)
GLOBULIN SER CALC-MCNC: 1.9 G/DL (CALC) (ref 1.9–3.7)
GLUCOSE SERPL-MCNC: 80 MG/DL (ref 65–99)
HCT VFR BLD AUTO: 44.1 % (ref 35–45)
HDLC SERPL-MCNC: 51 MG/DL
HGB BLD-MCNC: 14.2 G/DL (ref 11.7–15.5)
LDLC SERPL CALC-MCNC: 137 MG/DL (CALC)
LYMPHOCYTES # BLD AUTO: 1757 CELLS/UL (ref 850–3900)
LYMPHOCYTES NFR BLD AUTO: 24.4 %
MCH RBC QN AUTO: 31 PG (ref 27–33)
MCHC RBC AUTO-ENTMCNC: 32.2 G/DL (ref 32–36)
MCV RBC AUTO: 96.3 FL (ref 80–100)
MONOCYTES # BLD AUTO: 504 CELLS/UL (ref 200–950)
MONOCYTES NFR BLD AUTO: 7 %
NEUTROPHILS # BLD AUTO: 4817 CELLS/UL (ref 1500–7800)
NEUTROPHILS NFR BLD AUTO: 66.9 %
NONHDLC SERPL-MCNC: 172 MG/DL (CALC)
PLATELET # BLD AUTO: 249 THOUSAND/UL (ref 140–400)
PMV BLD REES-ECKER: 11.1 FL (ref 7.5–12.5)
POTASSIUM SERPL-SCNC: 4 MMOL/L (ref 3.5–5.3)
PROT SERPL-MCNC: 6.6 G/DL (ref 6.1–8.1)
RBC # BLD AUTO: 4.58 MILLION/UL (ref 3.8–5.1)
SODIUM SERPL-SCNC: 140 MMOL/L (ref 135–146)
TRIGL SERPL-MCNC: 211 MG/DL
TSH SERPL-ACNC: 1.11 MIU/L (ref 0.4–4.5)
WBC # BLD AUTO: 7.2 THOUSAND/UL (ref 3.8–10.8)

## 2025-06-06 ENCOUNTER — OFFICE VISIT (OUTPATIENT)
Dept: FAMILY MEDICINE | Facility: CLINIC | Age: 60
End: 2025-06-06
Payer: COMMERCIAL

## 2025-06-06 VITALS
SYSTOLIC BLOOD PRESSURE: 132 MMHG | HEIGHT: 64 IN | WEIGHT: 143.63 LBS | HEART RATE: 84 BPM | DIASTOLIC BLOOD PRESSURE: 82 MMHG | BODY MASS INDEX: 24.52 KG/M2

## 2025-06-06 DIAGNOSIS — K21.9 GASTROESOPHAGEAL REFLUX DISEASE, UNSPECIFIED WHETHER ESOPHAGITIS PRESENT: ICD-10-CM

## 2025-06-06 DIAGNOSIS — M54.2 NECK PAIN: Primary | ICD-10-CM

## 2025-06-06 DIAGNOSIS — G44.229 CHRONIC TENSION-TYPE HEADACHE, NOT INTRACTABLE: ICD-10-CM

## 2025-06-06 DIAGNOSIS — D17.20 LIPOMA OF FOREARM: ICD-10-CM

## 2025-06-06 DIAGNOSIS — G43.709 CHRONIC MIGRAINE WITHOUT AURA WITHOUT STATUS MIGRAINOSUS, NOT INTRACTABLE: ICD-10-CM

## 2025-06-06 DIAGNOSIS — R53.83 FATIGUE, UNSPECIFIED TYPE: ICD-10-CM

## 2025-06-06 PROBLEM — D17.1 LIPOMA OF BACK: Status: ACTIVE | Noted: 2025-06-06

## 2025-06-06 PROCEDURE — 99214 OFFICE O/P EST MOD 30 MIN: CPT | Mod: S$GLB,,,

## 2025-06-06 PROCEDURE — 3075F SYST BP GE 130 - 139MM HG: CPT | Mod: CPTII,S$GLB,,

## 2025-06-06 PROCEDURE — 3008F BODY MASS INDEX DOCD: CPT | Mod: CPTII,S$GLB,,

## 2025-06-06 PROCEDURE — 3079F DIAST BP 80-89 MM HG: CPT | Mod: CPTII,S$GLB,,

## 2025-06-06 RX ORDER — SUMATRIPTAN SUCCINATE 50 MG/1
50 TABLET ORAL DAILY PRN
Qty: 16 TABLET | Refills: 1 | Status: SHIPPED | OUTPATIENT
Start: 2025-06-06

## 2025-06-06 RX ORDER — ESOMEPRAZOLE MAGNESIUM 40 MG/1
40 CAPSULE, DELAYED RELEASE ORAL
Qty: 90 CAPSULE | Refills: 3 | Status: SHIPPED | OUTPATIENT
Start: 2025-06-06 | End: 2026-06-06

## 2025-06-06 RX ORDER — DICYCLOMINE HYDROCHLORIDE 10 MG/1
10 CAPSULE ORAL
Start: 2025-06-06

## 2025-06-06 RX ORDER — FAMOTIDINE 40 MG/1
40 TABLET, FILM COATED ORAL NIGHTLY
Qty: 90 TABLET | Refills: 3 | Status: SHIPPED | OUTPATIENT
Start: 2025-06-06 | End: 2026-06-06

## 2025-06-06 NOTE — PATIENT INSTRUCTIONS
Juan Moncada,     If you are due for any health screening(s) below please notify me so we can arrange them to be ordered and scheduled. Most healthy patients at your age complete them, but you are free to accept or refuse.     If you can't do it, I'll definitely understand. If you can, I'd certainly appreciate it!    All of your core healthy metrics are met.              IB guard over the counter natural supplement     [facility]
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
## 2025-06-20 ENCOUNTER — TELEPHONE (OUTPATIENT)
Dept: NEUROSURGERY | Facility: CLINIC | Age: 60
End: 2025-06-20

## 2025-06-20 ENCOUNTER — OFFICE VISIT (OUTPATIENT)
Dept: NEUROSURGERY | Facility: CLINIC | Age: 60
End: 2025-06-20
Payer: COMMERCIAL

## 2025-06-20 VITALS
BODY MASS INDEX: 24.65 KG/M2 | HEIGHT: 64 IN | DIASTOLIC BLOOD PRESSURE: 80 MMHG | HEART RATE: 72 BPM | SYSTOLIC BLOOD PRESSURE: 126 MMHG

## 2025-06-20 DIAGNOSIS — M54.2 CERVICALGIA: Primary | ICD-10-CM

## 2025-06-20 DIAGNOSIS — R20.2 PARESTHESIAS: ICD-10-CM

## 2025-06-20 DIAGNOSIS — G44.229 CHRONIC TENSION-TYPE HEADACHE, NOT INTRACTABLE: ICD-10-CM

## 2025-06-20 DIAGNOSIS — G89.29 CHRONIC RADICULAR LUMBAR PAIN: ICD-10-CM

## 2025-06-20 DIAGNOSIS — M54.16 CHRONIC RADICULAR LUMBAR PAIN: ICD-10-CM

## 2025-06-20 DIAGNOSIS — M54.2 NECK PAIN: ICD-10-CM

## 2025-06-20 RX ORDER — PREGABALIN 75 MG/1
75 CAPSULE ORAL NIGHTLY PRN
Qty: 30 CAPSULE | Refills: 0 | Status: SHIPPED | OUTPATIENT
Start: 2025-06-20 | End: 2025-07-20

## 2025-06-20 RX ORDER — DIAZEPAM 5 MG/1
5 TABLET ORAL
Qty: 2 TABLET | Refills: 0 | Status: SHIPPED | OUTPATIENT
Start: 2025-06-20

## 2025-06-20 NOTE — PROGRESS NOTES
Neurosurgery  History & Physical    SUBJECTIVE:     Chief Complaint:  Chronic neck pain    History of Present Illness:  Coral Gerard is a 60 y.o. female with past medical history of irritable bowel syndrome, anxiety, migraines, hormonal replacement therapy referred by MONI Merchant, for chronic neck pain.  Patient presented reporting chronic cervical and lumbosacral pain.  Patient reports posterior cervical pain/burning sensation which radiates to left scapula.  She also reports left forearm and left hand/fingers paresthesias.  She is left-handed.  She has difficulty sleeping at night due to paresthesias.   She props arm on a pillow for nighttime relief. She currently works at a desk and symptoms are exacerbated with head flexion.  Patient has trialed physical therapy for 6 weeks without relief.  She has also trialed Voltaren and lidocaine patches with some relief.  She was recently diagnosed with tennis and golf elbow.  Furthermore she reports chronic low back pain that radiates to left buttock and posterior thigh.  She also reports diffuse left foot paresthesias.  She denies falls, imbalance, saddle anesthesia, urinary or bowel dysfunction.  She denies epidural steroid injections or previous spinal surgery.    AP/AC: none  Smoking status-: Former smoker, quit 10 years ago      Review of patient's allergies indicates:   Allergen Reactions    Adhesive      Paper tape ok    Chlorpheniramine-pseudoephed      anphylaxis    Demerol (pf) [meperidine (pf)] Itching    Ecklonia kurome      Anaphylaxis from iodine dye       Current Medications[1]    Family History       Problem Relation (Age of Onset)    Diabetes Mother, Father          Social History     Socioeconomic History    Marital status:    Tobacco Use    Smoking status: Former     Types: Cigarettes    Smokeless tobacco: Never   Substance and Sexual Activity    Alcohol use: Yes     Alcohol/week: 14.0 standard drinks of alcohol     Types: 14 Glasses of wine  "per week    Drug use: Never   Social History Narrative    Worked as . Not currently working    Gets about 7 hours of sleep per night    No formal exercise.     Works in yard, and pool.        OBJECTIVE:     Vital Signs  Pulse: 72  BP: 126/80  Pain Score:   8  Height: 5' 4" (162.6 cm)  Body mass index is 24.65 kg/m².      Neurosurgery Physical Exam    General:  Very pleasant, no acute distress.  Neurologic: Alert and oriented. Thought content appropriate.  GCS: E4 V5 M6; Total: 15  Mental Status: Awake, Alert, Oriented   Pulmonary: normal respirations, no signs of respiratory distress    Sensory: intact to light touch throughout  Motor Strength: Moves all extremities spontaneously.  No abnormal movements seen.   Bilateral upper extremity deltoid/biceps/triceps/hand  5/5   Bilateral lower extremity strength iliopsoas/TA/EHL/gastrocnemius 5/5    Reflexes Right Left   Biceps 2+ 2+   Brachioradialis 2+ 3 +   Triceps 2+ 2+   Patellar 2+ 2+   Lopez's Neg Neg       Gait: stable  Tandem Gait: No difficulty  Able to walk on heels & toes     Cervical:   Midline TTP: Negative.     Thoracic:  Midline TTP: Negative.     Lumbar:  Midline TTP: Negative.  Straight Leg Test: Negative.       Diagnostic Imaging:  Cervical MRI (08/26/2020).  Reviewed.      ASSESSMENT/PLAN:     1. Cervicalgia    2. Chronic radicular lumbar pain    3. Paresthesias    4. Neck pain    5. Chronic tension-type headache, not intractable        Patient primarily reports cervical pain with left upper extremity paresthesias.  She also reports lumbosacral pain with left leg radicular symptoms.  She is neurologically intact.  Cervical and lumbar MRI ordered for further evaluation.  Pregabalin prescription provided.    Patient will follow-up with Dr. Reece after imaging.          Cervicalgia  -     MRI Cervical Spine Without Contrast; Future; Expected date: 06/20/2025  -     X-Ray Cervical Spine 2 or 3 Views; Future; Expected date: 09/20/2025  -     " pregabalin (LYRICA) 75 MG capsule; Take 1 capsule (75 mg total) by mouth nightly as needed (parasthesias).  Dispense: 30 capsule; Refill: 0  -     diazePAM (VALIUM) 5 MG tablet; Take 1 tablet (5 mg total) by mouth On call Procedure for Anxiety. Take 1 tablet 30 minutes prior to start of MRI.  Take 2nd tablet as needed.  Dispense: 2 tablet; Refill: 0    Chronic radicular lumbar pain  -     MRI Lumbar Spine Without Contrast; Future; Expected date: 06/20/2025  -     X-Ray Lumbar Spine AP And Lateral; Future; Expected date: 06/20/2025  -     diazePAM (VALIUM) 5 MG tablet; Take 1 tablet (5 mg total) by mouth On call Procedure for Anxiety. Take 1 tablet 30 minutes prior to start of MRI.  Take 2nd tablet as needed.  Dispense: 2 tablet; Refill: 0    Paresthesias  -     pregabalin (LYRICA) 75 MG capsule; Take 1 capsule (75 mg total) by mouth nightly as needed (parasthesias).  Dispense: 30 capsule; Refill: 0    Neck pain  -     Ambulatory referral/consult to Physical Medicine Rehab    Chronic tension-type headache, not intractable  -     Ambulatory referral/consult to Physical Medicine Rehab        I spent a total of 55 minutes of non-face and face to face time preparing to see the patient (eg, review of tests), obtain and/or review separately obtained history, document clinical information in the electronic or other health record, interpret results and communicate results to the patient/family/caregiver, or care coordinator.    Note dictated with voice recognition software, please excuse any grammatical errors.      Laurence Kwan PA-C  Neurosurgery  [facility]           [1]   Current Outpatient Medications   Medication Sig Dispense Refill    aluminum chloride (DRYSOL) 20 % external solution Apply topically every evening.      azelastine-fluticasone (DYMISTA) 137-50 mcg/spray Spry nassal spray 1 spray by Each Nostril route 2 (two) times daily. 23 g 0    diclofenac sodium (VOLTAREN) 1 % Gel Apply 2 g  topically 4 (four) times daily.      dicyclomine (BENTYL) 10 MG capsule Take 1 capsule (10 mg total) by mouth 4 (four) times daily before meals and nightly.      esomeprazole (NEXIUM) 40 MG capsule Take 1 capsule (40 mg total) by mouth before breakfast. 90 capsule 3    estradioL (ESTRACE) 0.01 % (0.1 mg/gram) vaginal cream Place vaginally once daily.      famotidine (PEPCID) 40 MG tablet Take 1 tablet (40 mg total) by mouth nightly. 90 tablet 3    loratadine (CLARITIN) 10 mg tablet Take 10 mg by mouth once daily.      mupirocin (BACTROBAN) 2 % ointment Apply topically 3 (three) times daily. 22 g 0    sumatriptan (IMITREX) 50 MG tablet Take 1 tablet (50 mg total) by mouth daily as needed for Migraine (take one tablet at onset of migraine symptoms. may repeat in 2 hours if no relief, but maximum 2 pills in 24 hours). 16 tablet 1    diazePAM (VALIUM) 5 MG tablet Take 1 tablet (5 mg total) by mouth On call Procedure for Anxiety. Take 1 tablet 30 minutes prior to start of MRI.  Take 2nd tablet as needed. 2 tablet 0    pregabalin (LYRICA) 75 MG capsule Take 1 capsule (75 mg total) by mouth nightly as needed (parasthesias). 30 capsule 0     No current facility-administered medications for this visit.

## 2025-07-05 ENCOUNTER — HOSPITAL ENCOUNTER (OUTPATIENT)
Dept: RADIOLOGY | Facility: HOSPITAL | Age: 60
Discharge: HOME | End: 2025-07-05
Attending: HEALTH CARE PROVIDER
Payer: COMMERCIAL

## 2025-07-05 DIAGNOSIS — M54.16 CHRONIC RADICULAR LUMBAR PAIN: ICD-10-CM

## 2025-07-05 DIAGNOSIS — G89.29 CHRONIC RADICULAR LUMBAR PAIN: ICD-10-CM

## 2025-07-05 DIAGNOSIS — M54.2 CERVICALGIA: ICD-10-CM

## 2025-07-05 PROCEDURE — 72141 MRI NECK SPINE W/O DYE: CPT | Mod: TC

## 2025-07-05 PROCEDURE — 72100 X-RAY EXAM L-S SPINE 2/3 VWS: CPT | Mod: 26,,, | Performed by: RADIOLOGY

## 2025-07-05 PROCEDURE — 72141 MRI NECK SPINE W/O DYE: CPT | Mod: 26,,, | Performed by: RADIOLOGY

## 2025-07-05 PROCEDURE — 72148 MRI LUMBAR SPINE W/O DYE: CPT | Mod: 26,,, | Performed by: RADIOLOGY

## 2025-07-05 PROCEDURE — 72148 MRI LUMBAR SPINE W/O DYE: CPT | Mod: TC

## 2025-07-05 PROCEDURE — 72100 X-RAY EXAM L-S SPINE 2/3 VWS: CPT | Mod: TC

## 2025-07-10 ENCOUNTER — OFFICE VISIT (OUTPATIENT)
Dept: NEUROSURGERY | Facility: CLINIC | Age: 60
End: 2025-07-10
Payer: COMMERCIAL

## 2025-07-10 ENCOUNTER — OFFICE VISIT (OUTPATIENT)
Dept: SURGERY | Facility: CLINIC | Age: 60
End: 2025-07-10
Payer: COMMERCIAL

## 2025-07-10 ENCOUNTER — TELEPHONE (OUTPATIENT)
Dept: PAIN MEDICINE | Facility: CLINIC | Age: 60
End: 2025-07-10
Payer: COMMERCIAL

## 2025-07-10 VITALS
SYSTOLIC BLOOD PRESSURE: 120 MMHG | TEMPERATURE: 97 F | BODY MASS INDEX: 24.5 KG/M2 | WEIGHT: 143.5 LBS | DIASTOLIC BLOOD PRESSURE: 80 MMHG | HEIGHT: 64 IN | HEART RATE: 80 BPM

## 2025-07-10 VITALS
HEIGHT: 64 IN | BODY MASS INDEX: 24.64 KG/M2 | DIASTOLIC BLOOD PRESSURE: 74 MMHG | SYSTOLIC BLOOD PRESSURE: 105 MMHG | HEART RATE: 80 BPM

## 2025-07-10 DIAGNOSIS — D17.20 LIPOMA OF FOREARM: ICD-10-CM

## 2025-07-10 DIAGNOSIS — M48.02 CERVICAL SPINAL STENOSIS: Primary | ICD-10-CM

## 2025-07-10 DIAGNOSIS — M50.30 DDD (DEGENERATIVE DISC DISEASE), CERVICAL: ICD-10-CM

## 2025-07-10 DIAGNOSIS — M48.00 CENTRAL STENOSIS OF SPINAL CANAL: ICD-10-CM

## 2025-07-10 DIAGNOSIS — M48.02 CERVICAL STENOSIS OF SPINAL CANAL: ICD-10-CM

## 2025-07-10 DIAGNOSIS — R20.2 PARESTHESIA OF LEFT ARM: Primary | ICD-10-CM

## 2025-07-10 DIAGNOSIS — M54.12 CERVICAL RADICULOPATHY: ICD-10-CM

## 2025-07-10 PROCEDURE — 99215 OFFICE O/P EST HI 40 MIN: CPT | Mod: S$GLB,,, | Performed by: NEUROLOGICAL SURGERY

## 2025-07-10 PROCEDURE — 3079F DIAST BP 80-89 MM HG: CPT | Mod: CPTII,S$GLB,, | Performed by: SURGERY

## 2025-07-10 PROCEDURE — 3078F DIAST BP <80 MM HG: CPT | Mod: CPTII,S$GLB,, | Performed by: NEUROLOGICAL SURGERY

## 2025-07-10 PROCEDURE — 3074F SYST BP LT 130 MM HG: CPT | Mod: CPTII,S$GLB,, | Performed by: SURGERY

## 2025-07-10 PROCEDURE — 99204 OFFICE O/P NEW MOD 45 MIN: CPT | Mod: S$GLB,,, | Performed by: SURGERY

## 2025-07-10 PROCEDURE — 1159F MED LIST DOCD IN RCRD: CPT | Mod: CPTII,S$GLB,, | Performed by: SURGERY

## 2025-07-10 PROCEDURE — 3008F BODY MASS INDEX DOCD: CPT | Mod: CPTII,S$GLB,, | Performed by: SURGERY

## 2025-07-10 PROCEDURE — 1160F RVW MEDS BY RX/DR IN RCRD: CPT | Mod: CPTII,S$GLB,, | Performed by: SURGERY

## 2025-07-10 PROCEDURE — 3008F BODY MASS INDEX DOCD: CPT | Mod: CPTII,S$GLB,, | Performed by: NEUROLOGICAL SURGERY

## 2025-07-10 PROCEDURE — 1159F MED LIST DOCD IN RCRD: CPT | Mod: CPTII,S$GLB,, | Performed by: NEUROLOGICAL SURGERY

## 2025-07-10 PROCEDURE — 3074F SYST BP LT 130 MM HG: CPT | Mod: CPTII,S$GLB,, | Performed by: NEUROLOGICAL SURGERY

## 2025-07-10 PROCEDURE — 99999 PR PBB SHADOW E&M-EST. PATIENT-LVL IV: CPT | Mod: PBBFAC,,, | Performed by: SURGERY

## 2025-07-10 NOTE — PROGRESS NOTES
History of Present Illness 06/2025:  Coral Gerard is a 60 y.o. female with past medical history of irritable bowel syndrome, anxiety, migraines, hormonal replacement therapy referred by NP Jeanie Merchant, for chronic neck pain.  Patient presented reporting chronic cervical and lumbosacral pain.  Patient reports posterior cervical pain/burning sensation which radiates to left scapula.  She also reports left forearm and left hand/fingers paresthesias.  She is left-handed.  She has difficulty sleeping at night due to paresthesias.   She props arm on a pillow for nighttime relief. She currently works at a desk and symptoms are exacerbated with head flexion.  Patient has trialed physical therapy for 6 weeks without relief.  She has also trialed Voltaren and lidocaine patches with some relief.  She was recently diagnosed with tennis and golf elbow.  Furthermore she reports chronic low back pain that radiates to left buttock and posterior thigh.  She also reports diffuse left foot paresthesias.  She denies falls, imbalance, saddle anesthesia, urinary or bowel dysfunction.  She denies epidural steroid injections or previous spinal surgery.     AP/AC: none  Smoking status-: Former smoker, quit 10 years ago    Interval 07/10/2025: She follows up with the MRI cervical and lumbar for review.  Initially evaluated by our neurosurgical physician assistant as above.  She endorses ongoing left neck pain with radiation of the left scapula, anterior lateral forearm, hand and finger paresthesias.  She reports longstanding neck pain which was exacerbated with a recent moved the area.  She has seen orthopedics were diagnosed her with left shoulder bursitis, tennis elbow, golf elbow.  She also endorses chronic axial lumbar pain with radiation of the left buttock and posterior thigh.  Denies lower extremity weakness or numbness.  She endorses urinary urgency.  Otherwise denies myelopathy symptoms.    Exam:  Pleasant  Awake, alert,  "oriented to person place and time.    Cranial nerves 2-12 grossly intact.    Strength is graded 5/5 in all muscle groups.    Sensation is grossly intact throughout.    Gait and stance are normal  Lopez's absent  No hyperreflexia  Cervical and lumbar range of motion full     I reviewed MRI cervical and lumbar in detail with the patient.  Date of study 07/05/2025.  Pertinent findings in the cervical region include moderate to severe multilevel cervical degenerative disc disease.  Moderate to severe cervical spinal canal stenosis, multilevel.  Multifocal moderate to severe foraminal stenosis.  No cord signal change.  In the lumbar region there is mild-to-moderate disc degeneration.  Left paracentral disc protrusion L4-5 without clear impingement of traversing L5 nerve root.  Alignment maintained.  No severe canal or foraminal stenosis.    Analysis: She has multilevel moderate to severe cervical degenerative disc disease with multifocal cervical spinal canal stenosis, severe at C5-6.  She has primarily left C6 radiculopathy with urinary urgency.  She has no other overt myelopathy signs or symptoms.  Given her findings I explained she would benefit from cervical decompression and fusion.  I briefly outlined the procedure, risks/benefits/methods/anticipated recovery.  She voiced understanding and prefers nonoperative management.  She notes that she is unable to take time off work for the next year.  We will refer her to Dr. Hercules, pain management, for left C5-6 T MANAV.  I advised her that she is at increased risk for spinal cord injury in the setting of trauma due to the severe canal stenosis.  I advised her to be promptly evaluated if she develops myelopathy symptoms.  She voiced understanding.  We will be glad to see her back at her discretion.    Coral"Hernan" was seen today for follow-up.    Diagnoses and all orders for this visit:    Cervical spinal stenosis  -     Procedure Order to Pain Management; " Future    DDD (degenerative disc disease), cervical    Cervical stenosis of spinal canal    Cervical radiculopathy    I spent a total of 40 minutes on the day of the visit.  This includes face to face time and non-face to face time preparing to see the patient (eg, review of tests), obtaining and/or reviewing separately obtained history, documenting clinical information in the electronic or other health record, independently interpreting results and communicating results to the patient/family/caregiver, or care coordinator.

## 2025-07-10 NOTE — LETTER
July 10, 2025      [facility] [address] [place] [address]  Phone: [phone]  Fax: [phone]       Patient: Coral Gerard   YOB: 1965  Date of Visit: 07/10/2025    To Whom It May Concern:    Amber Gerard  was at [facility] on 07/10/2025. The patient may return to work/school on 07/11/2025restrictions. If you have any questions or concerns, or if I can be of further assistance, please do not hesitate to contact me.    Sincerely,    Todd Reece, DO

## 2025-07-10 NOTE — TELEPHONE ENCOUNTER
Ordering User:KWAKU ROE MICHAEL [[id number]]   Encounter Provider:Todd Reece DO [[id number]]   Authorizing Provider: Todd Reece DO [[id number]]   Department:[facility] NEUROSURGERY[[id number]]      Common Order Information   Procedure -> Epidural Injection (specify level) Cmt: left C5-6 TESI      Order Specific Information   Order: Procedure Order to Pain Management [Custom: [id number]]  Order #:          [id number]: 1 FUTURE     Priority: Routine  Class: Clinic Performed     Future Order Information       Expires on:07/10/2026            Expected by:07/10/2025                   Associated Diagnoses       M48.02 Cervical spinal stenosis       Physician -> Dr. Abdi          Facility Name: -> [facility]              Priority: Routine  Class: Clinic Performed     Future Order Information       Expires on:07/10/2026            Expected by:07/10/2025                   Associated Diagnoses       M48.02 Cervical spinal stenosis       Procedure -> Epidural Injection (specify level) Cmt: left C5-6 TESI          Physician -> Dr. Abdi   Ok to schedule?

## 2025-07-10 NOTE — LETTER
July 10, 2025      [facility] [address] [place] [address]  Phone: [phone]  Fax: [phone]       Patient: Coral Gerard   YOB: 1965  Date of Visit: 07/10/2025    To Whom It May Concern:    Amber Gerard  was at [facility] on 07/10/2025. The patient may return to work/school on 07/11/2025 with no restrictions. If you have any questions or concerns, or if I can be of further assistance, please do not hesitate to contact me.    Sincerely,    Todd Reece, DO

## 2025-07-11 NOTE — PROGRESS NOTES
Subjective:       Patient ID: Coral Gerard is a 60 y.o. female.    Chief Complaint: Cyst      HPI:  58-year-old female presents for evaluation of her left arm.  She had lipoma excised from near the antecubital fossa 4 or 5 years ago in [place].  Over the past several months she has noted paresthesias going down the left forearm.  Seemed to be worse with any pressure applied to the previous excision site.  Also noticed possible swelling or recurrent lipoma at the surgery site.  Patient also diagnosed with tennis and golf elbow on the left side.  Also dealing with spine issues with recent MRI showing severe C4-C5 C5-C6 central canal stenosis.    Impression:     Disc osteophyte protrusion is at the C4-C5 C5-C6 and C6-C7 levels in particular and also at the C3-C4 level.  See above for details.  Central canal stenosis is noted most significantly at the C5-C6 level which is severe but that is also moderate to severe at the C4-C5 and C5-C6 levels    Past Medical History:   Diagnosis Date    Anxiety     Palpitations      Past Surgical History:   Procedure Laterality Date    BREAST BIOPSY      BREAST CYST ASPIRATION       SECTION       SECTION N/A     CHOLECYSTECTOMY N/A     HYSTERECTOMY       Review of patient's allergies indicates:   Allergen Reactions    Adhesive      Paper tape ok    Chlorpheniramine-pseudoephed      anphylaxis    Demerol (pf) [meperidine (pf)] Itching    Ecklonia kurome      Anaphylaxis from iodine dye     Medication List with Changes/Refills   Current Medications    ALUMINUM CHLORIDE (DRYSOL) 20 % EXTERNAL SOLUTION    Apply topically every evening.    AZELASTINE-FLUTICASONE (DYMISTA) 137-50 MCG/SPRAY SPRY NASSAL SPRAY    1 spray by Each Nostril route 2 (two) times daily.    DIAZEPAM (VALIUM) 5 MG TABLET    Take 1 tablet (5 mg total) by mouth On call Procedure for Anxiety. Take 1 tablet 30 minutes prior to start of MRI.  Take 2nd tablet as needed.    DICLOFENAC SODIUM (VOLTAREN) 1 % GEL     Apply 2 g topically 4 (four) times daily.    DICYCLOMINE (BENTYL) 10 MG CAPSULE    Take 1 capsule (10 mg total) by mouth 4 (four) times daily before meals and nightly.    ESOMEPRAZOLE (NEXIUM) 40 MG CAPSULE    Take 1 capsule (40 mg total) by mouth before breakfast.    ESTRADIOL (ESTRACE) 0.01 % (0.1 MG/GRAM) VAGINAL CREAM    Place vaginally once daily.    FAMOTIDINE (PEPCID) 40 MG TABLET    Take 1 tablet (40 mg total) by mouth nightly.    LORATADINE (CLARITIN) 10 MG TABLET    Take 10 mg by mouth once daily.    MUPIROCIN (BACTROBAN) 2 % OINTMENT    Apply topically 3 (three) times daily.    PREGABALIN (LYRICA) 75 MG CAPSULE    Take 1 capsule (75 mg total) by mouth nightly as needed (parasthesias).    SUMATRIPTAN (IMITREX) 50 MG TABLET    Take 1 tablet (50 mg total) by mouth daily as needed for Migraine (take one tablet at onset of migraine symptoms. may repeat in 2 hours if no relief, but maximum 2 pills in 24 hours).     Family History   Adopted: Yes   Problem Relation Name Age of Onset    Diabetes Mother      Diabetes Father       Social History     Socioeconomic History    Marital status:    Tobacco Use    Smoking status: Former     Types: Cigarettes    Smokeless tobacco: Never   Substance and Sexual Activity    Alcohol use: Yes     Alcohol/week: 14.0 standard drinks of alcohol     Types: 14 Glasses of wine per week    Drug use: Never   Social History Narrative    Worked as . Not currently working    Gets about 7 hours of sleep per night    No formal exercise.     Works in yard, and pool.          Review of Systems   Constitutional:  Negative for appetite change, chills, fever and unexpected weight change.   HENT:  Negative for hearing loss, rhinorrhea, sore throat and voice change.    Eyes:  Negative for photophobia and visual disturbance.   Respiratory:  Negative for cough, choking and shortness of breath.    Cardiovascular:  Negative for chest pain, palpitations and leg swelling.    Gastrointestinal:  Negative for abdominal pain, blood in stool, constipation, diarrhea, nausea and vomiting.   Endocrine: Negative for cold intolerance, heat intolerance, polydipsia and polyuria.   Musculoskeletal:  Positive for back pain and neck pain. Negative for arthralgias, joint swelling and neck stiffness.   Skin:  Negative for color change, pallor and rash.   Neurological:  Negative for dizziness, seizures, syncope and headaches.        Paresthesias left forearm   Hematological:  Negative for adenopathy. Does not bruise/bleed easily.   Psychiatric/Behavioral:  Negative for agitation, behavioral problems and confusion.        Objective:      Physical Exam  Constitutional:       General: She is awake. She is not in acute distress.     Appearance: Normal appearance. She is not toxic-appearing.   Pulmonary:      Effort: Pulmonary effort is normal. No tachypnea, bradypnea or respiratory distress.   Abdominal:      General: There is no distension.      Palpations: Abdomen is soft.   Skin:            Comments: Well-healed scar near the left antecubital fossa.  Mild prominence of tissue without definite mass appreciated.   Neurological:      Mental Status: She is alert.      Comments:  is strong bilaterally.  Seems equal.  Sensation intact bilaterally.  I did elicit some paresthesia sensation with pressure along the previous incision site.   Psychiatric:         Behavior: Behavior is cooperative.         Assessment/Plan:   Paresthesia of left arm    Lipoma of forearm  -     Ambulatory referral/consult to General Surgery    Central stenosis of spinal canal    Patient with previous lipoma resection left forearm.  She reports that she remembers that it was more extensive resection and it was most likely intramuscular, subfascial.  There is a slight prominence in the area.  Not convinced that there is a recurrence or new lipoma here.  Order ultrasound for further evaluation.  Patient also having some paresthesias  down the forearm near the elbow.  She has been diagnosed with tennis elbow and also golf elbow.  Also having severe central canal stenosis C4-C5 C5-C6 that could be contributing to paresthesias.   declines

## 2025-07-17 ENCOUNTER — HOSPITAL ENCOUNTER (OUTPATIENT)
Dept: RADIOLOGY | Facility: HOSPITAL | Age: 60
Discharge: HOME | End: 2025-07-17
Attending: SURGERY
Payer: COMMERCIAL

## 2025-07-17 DIAGNOSIS — R20.2 PARESTHESIA OF LEFT ARM: ICD-10-CM

## 2025-07-17 DIAGNOSIS — D17.20 LIPOMA OF FOREARM: ICD-10-CM

## 2025-07-17 PROCEDURE — 76882 US LMTD JT/FCL EVL NVASC XTR: CPT | Mod: TC,LT

## 2025-07-17 PROCEDURE — 76882 US LMTD JT/FCL EVL NVASC XTR: CPT | Mod: 26,LT,, | Performed by: RADIOLOGY

## 2025-07-21 ENCOUNTER — TELEPHONE (OUTPATIENT)
Dept: PAIN MEDICINE | Facility: CLINIC | Age: 60
End: 2025-07-21
Payer: COMMERCIAL

## 2025-07-21 DIAGNOSIS — M54.12 CERVICAL RADICULOPATHY: Primary | ICD-10-CM

## 2025-07-21 NOTE — TELEPHONE ENCOUNTER
"Coral Gerard "Hernan" (Patient)    Subject   GerardCoral carrasquillo "Hernan" (Patient)    Topic   General Inquiry - Return Call      Summary   Return Call   Communication   Type:  Patient Returning Call            Who Called:self      Who Left Message for Patient:issac      Does the patient know what this is regarding?:yes      Would the patient rather a call back or a response via [facility]? call      Best Call Back Number: [phone]            Additional Information: pt is at work, so if she doesn't answer, she will call back. Pt has question about everything as well, needs more details please advise and thank you     Department:[facility] NEUROSURGERY[[id number]]       Common Order Information   Procedure -> Epidural Injection (specify level) Cmt: left C5-6 TESI       Order Specific Information   Direct referral dr Reece   "

## 2025-08-08 ENCOUNTER — TELEPHONE (OUTPATIENT)
Dept: FAMILY MEDICINE | Facility: CLINIC | Age: 60
End: 2025-08-08
Payer: COMMERCIAL

## 2025-08-08 ENCOUNTER — HOSPITAL ENCOUNTER (OUTPATIENT)
Facility: HOSPITAL | Age: 60
Discharge: HOME | End: 2025-08-08
Attending: STUDENT IN AN ORGANIZED HEALTH CARE EDUCATION/TRAINING PROGRAM | Admitting: STUDENT IN AN ORGANIZED HEALTH CARE EDUCATION/TRAINING PROGRAM
Payer: COMMERCIAL

## 2025-08-08 VITALS
DIASTOLIC BLOOD PRESSURE: 73 MMHG | OXYGEN SATURATION: 97 % | RESPIRATION RATE: 16 BRPM | BODY MASS INDEX: 24.5 KG/M2 | WEIGHT: 143.5 LBS | SYSTOLIC BLOOD PRESSURE: 144 MMHG | HEART RATE: 83 BPM | HEIGHT: 64 IN

## 2025-08-08 DIAGNOSIS — M54.12 CERVICAL RADICULITIS: ICD-10-CM

## 2025-08-08 PROCEDURE — A9579 GAD-BASE MR CONTRAST NOS,1ML: HCPCS | Performed by: STUDENT IN AN ORGANIZED HEALTH CARE EDUCATION/TRAINING PROGRAM

## 2025-08-08 PROCEDURE — 25500020 PHARM REV CODE 255: Performed by: STUDENT IN AN ORGANIZED HEALTH CARE EDUCATION/TRAINING PROGRAM

## 2025-08-08 PROCEDURE — 63600175 PHARM REV CODE 636 W HCPCS: Performed by: STUDENT IN AN ORGANIZED HEALTH CARE EDUCATION/TRAINING PROGRAM

## 2025-08-08 PROCEDURE — A4216 STERILE WATER/SALINE, 10 ML: HCPCS | Performed by: STUDENT IN AN ORGANIZED HEALTH CARE EDUCATION/TRAINING PROGRAM

## 2025-08-08 PROCEDURE — 25000003 PHARM REV CODE 250: Performed by: STUDENT IN AN ORGANIZED HEALTH CARE EDUCATION/TRAINING PROGRAM

## 2025-08-08 RX ORDER — LIDOCAINE HYDROCHLORIDE 10 MG/ML
INJECTION, SOLUTION EPIDURAL; INFILTRATION; INTRACAUDAL; PERINEURAL
Status: DISCONTINUED | OUTPATIENT
Start: 2025-08-08 | End: 2025-08-08 | Stop reason: HOSPADM

## 2025-08-08 RX ORDER — LIDOCAINE HYDROCHLORIDE 10 MG/ML
1 INJECTION, SOLUTION EPIDURAL; INFILTRATION; INTRACAUDAL; PERINEURAL ONCE
Status: DISCONTINUED | OUTPATIENT
Start: 2025-08-08 | End: 2025-08-08 | Stop reason: HOSPADM

## 2025-08-08 RX ORDER — SODIUM CHLORIDE, SODIUM LACTATE, POTASSIUM CHLORIDE, CALCIUM CHLORIDE 600; 310; 30; 20 MG/100ML; MG/100ML; MG/100ML; MG/100ML
INJECTION, SOLUTION INTRAVENOUS CONTINUOUS
Status: DISCONTINUED | OUTPATIENT
Start: 2025-08-08 | End: 2025-08-08

## 2025-08-08 RX ORDER — SODIUM CHLORIDE 9 MG/ML
INJECTION, SOLUTION INTRAMUSCULAR; INTRAVENOUS; SUBCUTANEOUS
Status: DISCONTINUED | OUTPATIENT
Start: 2025-08-08 | End: 2025-08-08 | Stop reason: HOSPADM

## 2025-08-08 RX ORDER — DEXAMETHASONE SODIUM PHOSPHATE 10 MG/ML
INJECTION INTRAMUSCULAR; INTRAVENOUS
Status: DISCONTINUED | OUTPATIENT
Start: 2025-08-08 | End: 2025-08-08 | Stop reason: HOSPADM

## 2025-08-08 NOTE — TELEPHONE ENCOUNTER
----- Message from Laurence sent at 8/8/2025 10:58 AM CDT -----  Patient calling in regarding to not being able to go forward with her procedure due to abstract on her back. Patient asking to be seen today if possible. Would be willing to be seen by another NP since Milla is not in office.   [phone]

## 2025-08-08 NOTE — TELEPHONE ENCOUNTER
Spoke with patient and got her scheduled on Monday. They would like an abscess to be looked at before she has any procedures done.

## 2025-08-13 ENCOUNTER — TELEPHONE (OUTPATIENT)
Dept: PAIN MEDICINE | Facility: CLINIC | Age: 60
End: 2025-08-13
Payer: COMMERCIAL

## 2025-08-13 DIAGNOSIS — M54.12 CERVICAL RADICULOPATHY: Primary | ICD-10-CM

## 2025-09-02 ENCOUNTER — TELEPHONE (OUTPATIENT)
Dept: PAIN MEDICINE | Facility: CLINIC | Age: 60
End: 2025-09-02
Payer: COMMERCIAL